# Patient Record
Sex: FEMALE | Race: WHITE | NOT HISPANIC OR LATINO | Employment: OTHER | URBAN - METROPOLITAN AREA
[De-identification: names, ages, dates, MRNs, and addresses within clinical notes are randomized per-mention and may not be internally consistent; named-entity substitution may affect disease eponyms.]

---

## 2023-12-01 ENCOUNTER — HOSPITAL ENCOUNTER (INPATIENT)
Facility: HOSPITAL | Age: 83
LOS: 8 days | Discharge: NURSING, REHAB OR LONG-TERM CARE | DRG: 880 | End: 2023-12-12
Attending: STUDENT IN AN ORGANIZED HEALTH CARE EDUCATION/TRAINING PROGRAM | Admitting: STUDENT IN AN ORGANIZED HEALTH CARE EDUCATION/TRAINING PROGRAM
Payer: MEDICARE

## 2023-12-01 DIAGNOSIS — N17.9 AKI (ACUTE KIDNEY INJURY): ICD-10-CM

## 2023-12-01 DIAGNOSIS — N30.00 ACUTE CYSTITIS WITHOUT HEMATURIA: ICD-10-CM

## 2023-12-01 DIAGNOSIS — R01.1 MURMUR, CARDIAC: ICD-10-CM

## 2023-12-01 DIAGNOSIS — S32.9XXA CLOSED DISPLACED FRACTURE OF PELVIS, UNSPECIFIED PART OF PELVIS, INITIAL ENCOUNTER: ICD-10-CM

## 2023-12-01 DIAGNOSIS — R41.82 ALTERED MENTAL STATUS: ICD-10-CM

## 2023-12-01 DIAGNOSIS — S32.509A: Primary | ICD-10-CM

## 2023-12-01 DIAGNOSIS — E44.0 MODERATE MALNUTRITION: ICD-10-CM
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
## 2023-12-01 LAB
ALBUMIN SERPL-MCNC: 2.8 G/DL (ref 3.4–4.8)
ALBUMIN/GLOB SERPL: 0.8 RATIO (ref 1.1–2)
ALP SERPL-CCNC: 114 UNIT/L (ref 40–150)
ALT SERPL-CCNC: 22 UNIT/L (ref 0–55)
APPEARANCE UR: CLEAR
AST SERPL-CCNC: 35 UNIT/L (ref 5–34)
BACTERIA #/AREA URNS AUTO: ABNORMAL /HPF
BASOPHILS # BLD AUTO: 0.1 X10(3)/MCL
BASOPHILS NFR BLD AUTO: 1 %
BILIRUB SERPL-MCNC: 1.1 MG/DL
BILIRUB UR QL STRIP.AUTO: NEGATIVE
BUN SERPL-MCNC: 24.8 MG/DL (ref 9.8–20.1)
CALCIUM SERPL-MCNC: 9.1 MG/DL (ref 8.4–10.2)
CHLORIDE SERPL-SCNC: 106 MMOL/L (ref 98–107)
CO2 SERPL-SCNC: 22 MMOL/L (ref 23–31)
COLOR UR AUTO: YELLOW
CREAT SERPL-MCNC: 1.45 MG/DL (ref 0.55–1.02)
EOSINOPHIL # BLD AUTO: 0.12 X10(3)/MCL (ref 0–0.9)
EOSINOPHIL NFR BLD AUTO: 1.1 %
ERYTHROCYTE [DISTWIDTH] IN BLOOD BY AUTOMATED COUNT: 22.6 % (ref 11.5–17)
GFR SERPLBLD CREATININE-BSD FMLA CKD-EPI: 36 MLS/MIN/1.73/M2
GLOBULIN SER-MCNC: 3.7 GM/DL (ref 2.4–3.5)
GLUCOSE SERPL-MCNC: 135 MG/DL (ref 82–115)
GLUCOSE UR QL STRIP.AUTO: NORMAL
HCT VFR BLD AUTO: 29.1 % (ref 37–47)
HGB BLD-MCNC: 9.7 G/DL (ref 12–16)
HYALINE CASTS #/AREA URNS LPF: >20 /LPF
IMM GRANULOCYTES # BLD AUTO: 0.16 X10(3)/MCL (ref 0–0.04)
IMM GRANULOCYTES NFR BLD AUTO: 1.5 %
KETONES UR QL STRIP.AUTO: NEGATIVE
LACTATE SERPL-SCNC: 1.2 MMOL/L (ref 0.5–2.2)
LEUKOCYTE ESTERASE UR QL STRIP.AUTO: 25
LYMPHOCYTES # BLD AUTO: 1.89 X10(3)/MCL (ref 0.6–4.6)
LYMPHOCYTES NFR BLD AUTO: 18.1 %
MAGNESIUM SERPL-MCNC: 2.1 MG/DL (ref 1.6–2.6)
MCH RBC QN AUTO: 33 PG (ref 27–31)
MCHC RBC AUTO-ENTMCNC: 33.3 G/DL (ref 33–36)
MCV RBC AUTO: 99 FL (ref 80–94)
MONOCYTES # BLD AUTO: 0.91 X10(3)/MCL (ref 0.1–1.3)
MONOCYTES NFR BLD AUTO: 8.7 %
MUCOUS THREADS URNS QL MICRO: ABNORMAL /LPF
NEUTROPHILS # BLD AUTO: 7.26 X10(3)/MCL (ref 2.1–9.2)
NEUTROPHILS NFR BLD AUTO: 69.6 %
NITRITE UR QL STRIP.AUTO: NEGATIVE
NRBC BLD AUTO-RTO: 0.3 %
PH UR STRIP.AUTO: 5.5 [PH]
PLATELET # BLD AUTO: 165 X10(3)/MCL (ref 130–400)
PLATELETS.RETICULATED NFR BLD AUTO: 13.2 % (ref 0.9–11.2)
PMV BLD AUTO: ABNORMAL FL
POTASSIUM SERPL-SCNC: 4.9 MMOL/L (ref 3.5–5.1)
PROT SERPL-MCNC: 6.5 GM/DL (ref 5.8–7.6)
PROT UR QL STRIP.AUTO: NEGATIVE
RBC # BLD AUTO: 2.94 X10(6)/MCL (ref 4.2–5.4)
RBC #/AREA URNS AUTO: ABNORMAL /HPF
RBC UR QL AUTO: ABNORMAL
SODIUM SERPL-SCNC: 136 MMOL/L (ref 136–145)
SP GR UR STRIP.AUTO: 1.01 (ref 1–1.03)
SQUAMOUS #/AREA URNS LPF: ABNORMAL /HPF
UROBILINOGEN UR STRIP-ACNC: 2
WBC # SPEC AUTO: 10.44 X10(3)/MCL (ref 4.5–11.5)
WBC #/AREA URNS AUTO: ABNORMAL /HPF

## 2023-12-01 PROCEDURE — 85025 COMPLETE CBC W/AUTO DIFF WBC: CPT | Performed by: STUDENT IN AN ORGANIZED HEALTH CARE EDUCATION/TRAINING PROGRAM

## 2023-12-01 PROCEDURE — 80053 COMPREHEN METABOLIC PANEL: CPT | Performed by: STUDENT IN AN ORGANIZED HEALTH CARE EDUCATION/TRAINING PROGRAM

## 2023-12-01 PROCEDURE — 81001 URINALYSIS AUTO W/SCOPE: CPT | Performed by: STUDENT IN AN ORGANIZED HEALTH CARE EDUCATION/TRAINING PROGRAM

## 2023-12-01 PROCEDURE — 83735 ASSAY OF MAGNESIUM: CPT | Performed by: STUDENT IN AN ORGANIZED HEALTH CARE EDUCATION/TRAINING PROGRAM

## 2023-12-01 PROCEDURE — 27000221 HC OXYGEN, UP TO 24 HOURS

## 2023-12-01 PROCEDURE — 99285 EMERGENCY DEPT VISIT HI MDM: CPT | Mod: 25

## 2023-12-01 PROCEDURE — 87040 BLOOD CULTURE FOR BACTERIA: CPT | Performed by: STUDENT IN AN ORGANIZED HEALTH CARE EDUCATION/TRAINING PROGRAM

## 2023-12-01 PROCEDURE — 63600175 PHARM REV CODE 636 W HCPCS: Performed by: STUDENT IN AN ORGANIZED HEALTH CARE EDUCATION/TRAINING PROGRAM

## 2023-12-01 PROCEDURE — 83605 ASSAY OF LACTIC ACID: CPT | Performed by: STUDENT IN AN ORGANIZED HEALTH CARE EDUCATION/TRAINING PROGRAM

## 2023-12-01 PROCEDURE — 96360 HYDRATION IV INFUSION INIT: CPT

## 2023-12-01 RX ADMIN — SODIUM CHLORIDE, POTASSIUM CHLORIDE, SODIUM LACTATE AND CALCIUM CHLORIDE 1000 ML: 600; 310; 30; 20 INJECTION, SOLUTION INTRAVENOUS at 10:12

## 2023-12-02 PROBLEM — S32.9XXA CLOSED DISPLACED FRACTURE OF PELVIS: Status: ACTIVE | Noted: 2023-12-02

## 2023-12-02 LAB
ANISOCYTOSIS BLD QL SMEAR: ABNORMAL
DIGOXIN SERPL-MCNC: 0.3 NG/ML (ref 0.8–2)
MACROCYTES BLD QL SMEAR: ABNORMAL
PLATELET # BLD EST: NORMAL 10*3/UL
POIKILOCYTOSIS BLD QL SMEAR: ABNORMAL
POLYCHROMASIA BLD QL SMEAR: SLIGHT
RBC MORPH BLD: ABNORMAL
STOMATOCYTES (OLG): SLIGHT
TARGETS BLD QL SMEAR: SLIGHT
TEAR DROP CELL (OLG): SLIGHT

## 2023-12-02 PROCEDURE — 99204 PR OFFICE/OUTPT VISIT, NEW, LEVL IV, 45-59 MIN: ICD-10-PCS | Mod: 57,,, | Performed by: STUDENT IN AN ORGANIZED HEALTH CARE EDUCATION/TRAINING PROGRAM

## 2023-12-02 PROCEDURE — 96372 THER/PROPH/DIAG INJ SC/IM: CPT | Performed by: STUDENT IN AN ORGANIZED HEALTH CARE EDUCATION/TRAINING PROGRAM

## 2023-12-02 PROCEDURE — G0378 HOSPITAL OBSERVATION PER HR: HCPCS

## 2023-12-02 PROCEDURE — 99204 OFFICE O/P NEW MOD 45 MIN: CPT | Mod: 57,,, | Performed by: STUDENT IN AN ORGANIZED HEALTH CARE EDUCATION/TRAINING PROGRAM

## 2023-12-02 PROCEDURE — 25000003 PHARM REV CODE 250: Performed by: NURSE PRACTITIONER

## 2023-12-02 PROCEDURE — 63600175 PHARM REV CODE 636 W HCPCS: Performed by: STUDENT IN AN ORGANIZED HEALTH CARE EDUCATION/TRAINING PROGRAM

## 2023-12-02 PROCEDURE — 27197 CLSD TX PELVIC RING FX: CPT | Mod: LT,,, | Performed by: STUDENT IN AN ORGANIZED HEALTH CARE EDUCATION/TRAINING PROGRAM

## 2023-12-02 PROCEDURE — 80162 ASSAY OF DIGOXIN TOTAL: CPT | Performed by: NURSE PRACTITIONER

## 2023-12-02 PROCEDURE — 27197 PR CLOSED RX PELVIC RING FX/SUBLUX W/O MANIPULATION: ICD-10-PCS | Mod: LT,,, | Performed by: STUDENT IN AN ORGANIZED HEALTH CARE EDUCATION/TRAINING PROGRAM

## 2023-12-02 PROCEDURE — 25500020 PHARM REV CODE 255: Performed by: STUDENT IN AN ORGANIZED HEALTH CARE EDUCATION/TRAINING PROGRAM

## 2023-12-02 PROCEDURE — 25000003 PHARM REV CODE 250: Performed by: STUDENT IN AN ORGANIZED HEALTH CARE EDUCATION/TRAINING PROGRAM

## 2023-12-02 RX ORDER — ONDANSETRON 4 MG/1
4 TABLET, ORALLY DISINTEGRATING ORAL EVERY 6 HOURS PRN
Status: DISCONTINUED | OUTPATIENT
Start: 2023-12-02 | End: 2023-12-12 | Stop reason: HOSPADM

## 2023-12-02 RX ORDER — ENOXAPARIN SODIUM 100 MG/ML
40 INJECTION SUBCUTANEOUS EVERY 12 HOURS
Status: DISCONTINUED | OUTPATIENT
Start: 2023-12-02 | End: 2023-12-08

## 2023-12-02 RX ORDER — SODIUM CHLORIDE, SODIUM LACTATE, POTASSIUM CHLORIDE, CALCIUM CHLORIDE 600; 310; 30; 20 MG/100ML; MG/100ML; MG/100ML; MG/100ML
INJECTION, SOLUTION INTRAVENOUS CONTINUOUS
Status: DISCONTINUED | OUTPATIENT
Start: 2023-12-02 | End: 2023-12-06

## 2023-12-02 RX ORDER — BUDESONIDE 0.5 MG/2ML
0.5 INHALANT ORAL
Status: DISCONTINUED | OUTPATIENT
Start: 2023-12-02 | End: 2023-12-12 | Stop reason: HOSPADM

## 2023-12-02 RX ORDER — OXYCODONE HYDROCHLORIDE 5 MG/1
5 TABLET ORAL EVERY 4 HOURS PRN
Status: DISCONTINUED | OUTPATIENT
Start: 2023-12-02 | End: 2023-12-12 | Stop reason: HOSPADM

## 2023-12-02 RX ORDER — LIDOCAINE HYDROCHLORIDE 20 MG/ML
1 INJECTION, SOLUTION EPIDURAL; INFILTRATION; INTRACAUDAL; PERINEURAL ONCE AS NEEDED
Status: DISCONTINUED | OUTPATIENT
Start: 2023-12-02 | End: 2023-12-12 | Stop reason: HOSPADM

## 2023-12-02 RX ORDER — TALC
6 POWDER (GRAM) TOPICAL NIGHTLY PRN
Status: DISCONTINUED | OUTPATIENT
Start: 2023-12-02 | End: 2023-12-12 | Stop reason: HOSPADM

## 2023-12-02 RX ORDER — ACETAMINOPHEN 500 MG
650 TABLET ORAL DAILY
COMMUNITY

## 2023-12-02 RX ORDER — TORSEMIDE 20 MG/1
20 TABLET ORAL DAILY
Status: ON HOLD | COMMUNITY
End: 2023-12-12 | Stop reason: HOSPADM

## 2023-12-02 RX ORDER — LEVALBUTEROL INHALATION SOLUTION 1.25 MG/3ML
1 SOLUTION RESPIRATORY (INHALATION) EVERY 6 HOURS PRN
Status: DISCONTINUED | OUTPATIENT
Start: 2023-12-02 | End: 2023-12-12 | Stop reason: HOSPADM

## 2023-12-02 RX ORDER — LEVALBUTEROL INHALATION SOLUTION 1.25 MG/3ML
1 SOLUTION RESPIRATORY (INHALATION) EVERY 6 HOURS PRN
COMMUNITY

## 2023-12-02 RX ORDER — ACETAMINOPHEN 325 MG/1
650 TABLET ORAL DAILY
Status: DISCONTINUED | OUTPATIENT
Start: 2023-12-02 | End: 2023-12-03

## 2023-12-02 RX ORDER — DIGOXIN 125 MCG
125 TABLET ORAL DAILY
Status: DISCONTINUED | OUTPATIENT
Start: 2023-12-02 | End: 2023-12-12 | Stop reason: HOSPADM

## 2023-12-02 RX ORDER — ACETAMINOPHEN 325 MG/1
650 TABLET ORAL EVERY 4 HOURS PRN
Status: DISCONTINUED | OUTPATIENT
Start: 2023-12-02 | End: 2023-12-12 | Stop reason: HOSPADM

## 2023-12-02 RX ORDER — BUDESONIDE 1 MG/2ML
0.5 INHALANT ORAL
COMMUNITY

## 2023-12-02 RX ORDER — METOPROLOL SUCCINATE 25 MG/1
25 TABLET, EXTENDED RELEASE ORAL DAILY
COMMUNITY

## 2023-12-02 RX ORDER — FLUTICASONE FUROATE AND VILANTEROL 100; 25 UG/1; UG/1
1 POWDER RESPIRATORY (INHALATION) DAILY PRN
Status: DISCONTINUED | OUTPATIENT
Start: 2023-12-02 | End: 2023-12-12 | Stop reason: HOSPADM

## 2023-12-02 RX ORDER — PANTOPRAZOLE SODIUM 40 MG/1
40 TABLET, DELAYED RELEASE ORAL DAILY PRN
COMMUNITY

## 2023-12-02 RX ORDER — SIMVASTATIN 20 MG/1
20 TABLET, FILM COATED ORAL DAILY
COMMUNITY

## 2023-12-02 RX ORDER — PANTOPRAZOLE SODIUM 40 MG/1
40 TABLET, DELAYED RELEASE ORAL DAILY
Status: DISCONTINUED | OUTPATIENT
Start: 2023-12-02 | End: 2023-12-12 | Stop reason: HOSPADM

## 2023-12-02 RX ORDER — POTASSIUM CHLORIDE 1125 MG/1
20 TABLET, EXTENDED RELEASE ORAL 2 TIMES DAILY
Status: ON HOLD | COMMUNITY
End: 2023-12-12 | Stop reason: HOSPADM

## 2023-12-02 RX ORDER — DIGOXIN 125 MCG
125 TABLET ORAL DAILY
COMMUNITY

## 2023-12-02 RX ORDER — OXYCODONE HYDROCHLORIDE 10 MG/1
10 TABLET ORAL EVERY 4 HOURS PRN
Status: DISCONTINUED | OUTPATIENT
Start: 2023-12-02 | End: 2023-12-12 | Stop reason: HOSPADM

## 2023-12-02 RX ADMIN — ACETAMINOPHEN 650 MG: 325 TABLET, FILM COATED ORAL at 01:12

## 2023-12-02 RX ADMIN — ENOXAPARIN SODIUM 40 MG: 40 INJECTION SUBCUTANEOUS at 08:12

## 2023-12-02 RX ADMIN — ENOXAPARIN SODIUM 40 MG: 40 INJECTION SUBCUTANEOUS at 09:12

## 2023-12-02 RX ADMIN — ACETAMINOPHEN 650 MG: 325 TABLET, FILM COATED ORAL at 04:12

## 2023-12-02 RX ADMIN — DIGOXIN 125 MCG: 125 TABLET ORAL at 08:12

## 2023-12-02 RX ADMIN — Medication 6 MG: at 09:12

## 2023-12-02 RX ADMIN — OXYCODONE HYDROCHLORIDE 5 MG: 5 TABLET ORAL at 08:12

## 2023-12-02 RX ADMIN — METOPROLOL SUCCINATE 12.5 MG: 25 TABLET, EXTENDED RELEASE ORAL at 09:12

## 2023-12-02 RX ADMIN — SODIUM CHLORIDE, POTASSIUM CHLORIDE, SODIUM LACTATE AND CALCIUM CHLORIDE: 600; 310; 30; 20 INJECTION, SOLUTION INTRAVENOUS at 04:12

## 2023-12-02 RX ADMIN — CEFTRIAXONE SODIUM 2 G: 2 INJECTION, POWDER, FOR SOLUTION INTRAMUSCULAR; INTRAVENOUS at 04:12

## 2023-12-02 RX ADMIN — PANTOPRAZOLE SODIUM 40 MG: 40 TABLET, DELAYED RELEASE ORAL at 08:12

## 2023-12-02 RX ADMIN — IOPAMIDOL 100 ML: 755 INJECTION, SOLUTION INTRAVENOUS at 12:12

## 2023-12-02 NOTE — ED PROVIDER NOTES
Encounter Date: 12/1/2023    SCRIBE #1 NOTE: I, Tobi Munoz, am scribing for, and in the presence of,  Sukhdev Vega IV, MD. I have scribed the following portions of the note - Other sections scribed: HPI, ROS, PE.       History     Chief Complaint   Patient presents with    Altered Mental Status     Fall 2 days ago went to [facility] - dx w/ pelvic fx and uti, hallucinations present before fall 2 days ago - cont and worsening - family concerned for increased ams and unable to have home health come see pt      83 year old female with pmhx of dementia presents to ED for AMS onset 2 days ago. Daughter states that pt fell and hit head 2 days ago and went to [facility] anD was diagnosed with pelvic fracture and UTI and sent home with norco, has not been seen by home health. Daughter states that pt has become progressively more confused and disoriented over the past 2 days, with mood changes.  Pt denies pain.    The history is provided by a relative. The history is limited by the condition of the patient. No  was used.     Review of patient's allergies indicates:  No Known Allergies  No past medical history on file.  No past surgical history on file.  No family history on file.     Review of Systems   Unable to perform ROS: Dementia   Psychiatric/Behavioral:  Positive for confusion.        Physical Exam     Initial Vitals [12/01/23 2153]   BP Pulse Resp Temp SpO2   (!) 102/58 (!) 119 20 99.7 °F (37.6 °C) 96 %      MAP       --         Physical Exam    Nursing note and vitals reviewed.  Constitutional: She is not diaphoretic. No distress.   Cardiovascular:  Regular rhythm.   Tachycardia present.         No murmur heard.  Pulmonary/Chest: Breath sounds normal. No respiratory distress. She has no wheezes. She has no rales.   Abdominal: Abdomen is soft. She exhibits no distension. There is no abdominal tenderness.   Musculoskeletal:      Comments: L hip tenderness     Neurological: She is alert.   Altered,  confused, disoriented    Psychiatric: She has a normal mood and affect.         ED Course   Procedures  Labs Reviewed   COMPREHENSIVE METABOLIC PANEL - Abnormal; Notable for the following components:       Result Value    Carbon Dioxide 22 (*)     Glucose Level 135 (*)     Blood Urea Nitrogen 24.8 (*)     Creatinine 1.45 (*)     Albumin Level 2.8 (*)     Globulin 3.7 (*)     Albumin/Globulin Ratio 0.8 (*)     Aspartate Aminotransferase 35 (*)     All other components within normal limits   URINALYSIS, REFLEX TO URINE CULTURE - Abnormal; Notable for the following components:    Blood, UA Trace (*)     Urobilinogen, UA 2.0 (*)     Leukocyte Esterase, UA 25 (*)     WBC, UA 6-10 (*)     Mucous, UA Trace (*)     Hyaline Casts, UA >20 (*)     RBC, UA 11-20 (*)     All other components within normal limits   CBC WITH DIFFERENTIAL - Abnormal; Notable for the following components:    RBC 2.94 (*)     Hgb 9.7 (*)     Hct 29.1 (*)     MCV 99.0 (*)     MCH 33.0 (*)     RDW 22.6 (*)     IG# 0.16 (*)     IPF 13.2 (*)     All other components within normal limits   BLOOD SMEAR MICROSCOPIC EXAM ([facility]) - Abnormal; Notable for the following components:    RBC Morph Abnormal (*)     Anisocytosis 1+ (*)     Macrocytosis 1+ (*)     Poikilocytosis 1+ (*)     Polychromasia Slight (*)     Stomatocytes Slight (*)     Target Cells Slight (*)     Tear Drops Slight (*)     All other components within normal limits   LACTIC ACID, PLASMA - Normal   MAGNESIUM - Normal   BLOOD CULTURE [facility]   BLOOD CULTURE [facility]   CBC W/ AUTO DIFFERENTIAL    Narrative:     The following orders were created for panel order CBC auto differential.  Procedure                               Abnormality         Status                     ---------                               -----------         ------                     CBC with Differential[[id number]]       Abnormal            Final result                 Please view results for these tests on the individual orders.           Imaging Results              CT Chest Abdomen Pelvis With IV Contrast (XPD) NO Oral Contrast (Preliminary result)  Result time 12/02/23 00:52:12      Preliminary result by Piero Nicolas MD (12/02/23 00:52:12)                   Narrative:    START OF REPORT:  Technique: CT Scan of the chest abdomen and pelvis was performed with intravenous contrast with axial as well as sagittal and, coronal images.    Dosage Information: Automated Exposure Control was utilized.    Comparison: None.    Clinical History: Altered Mental Status (Fall 2 days ago went to [facility] [place] [place] - dx w/ pelvic fx and uti, hallucinations present before fall 2 days ago - cont and worsening - family concerned for increased ams.    Findings:  Soft Tissues: Unremarkable.  Neck: The thyroid gland appear unremarkable.  Mediastinum: The mediastinal structures are within normal limits.  Heart: The heart size is within normal limits. Severe coronary artery calcification is seen.  Aorta: No aortic dissection or aneurysm is seen. Moderate aortic calcification is seen in the thoracic aorta.  Pulmonary Arteries: Unremarkable.  Lungs: Moderate emphysematous changes are identified in the lungs. No focal consolidation is seen. A geographic area of ground-glass opacity is seen in the posterior aspect of the right upper lobe, which may reflect focal infiltrate or possibly lung contusion (image 37 series 3).  Pleura: No effusions or pneumothorax are identified.  Bony Structures:  Spine: Mild spondylolytic changes are seen in the thoracic spine.  Ribs: The ribs appear unremarkable.  Abdomen:  Abdominal Wall: There is a small umbilical hernia which contains mesenteric fat.  Liver: The liver appears unremarkable.  Biliary System: No intrahepatic or extrahepatic biliary duct dilatation is seen.  Gallbladder: Surgical clips are seen in the gallbladder fossa consistent with prior cholecystectomy.  Pancreas: The pancreas appears unremarkable.  Spleen: The spleen appears  unremarkable.  Adrenals: The adrenal glands appear unremarkable.  Kidneys: The kidneys appear unremarkable with no stones cysts masses or hydronephrosis.  Aorta: There is moderate calcification of the abdominal aorta and its branches.  IVC: Unremarkable.  Bowel:  Esophagus: The visualized esophagus appears unremarkable.  Stomach: The stomach appears unremarkable.  Duodenum: Unremarkable appearing duodenum.  Small Bowel: The small bowel appears unremarkable.  Colon: Multiple diverticula are seen in the colon. No associated inflammatory stranding or pericolonic fluid is seen to suggest diverticulitis.  Appendix: The appendix is not identified but no inflammatory changes are seen in the right lower quadrant to suggest appendicitis.  Peritoneum: No intraperitoneal free air or ascites is seen.    Pelvis:  Bladder: The bladder is nondistended and cannot be definitively evaluated. A clements catheter is in place.  Female:  Ovaries: No adnexal masses are seen.    Bony structures: The bones are osteopenic.  Dorsal Spine: There is moderate spondylosis of the visualized dorsal spine. Chronic compression deformity is seen involving L1 vertebral body with vertebra plana.  Bony Pelvis: There are comminuted displaced fractures involving the left superior and inferior pubic rami (series 2, images 167-183). There is a soft tissue hematoma in the extraperitoneal spaces of the left pelvis/ in the left perivesical region, which measures approximately 3 x 1.7 cm (series 2, image 165). There is asymmetric intramuscular hematoma involving the left obturator internus muscle (series 2, image 169). There is extensive perivesical fat stranding. There is small amount of free fluid in the presacral spaces.      Impression:  1. Severe coronary artery calcification is seen.  2. There are comminuted displaced fractures involving the left superior and inferior pubic rami (series 2, images 167-183). There is a soft tissue hematoma in the  extraperitoneal spaces of the left pelvis/ in the left perivesical region, which measures approximately 3 x 1.7 cm (series 2, image 165). There is asymmetric intramuscular hematoma involving the left obturator internus muscle (series 2, image 169). There is extensive perivesical fat stranding. There is small amount of free fluid in the presacral spaces.  3. Moderate emphysematous changes are identified in the lungs. No focal consolidation is seen. A geographic area of ground-glass opacity is seen in the posterior aspect of the right upper lobe, which may reflect focal infiltrate or possibly lung contusion (image 37 series 3). Follow-up as clinically indicated.  4. Details and other findings as discussed above.                                         CT Cervical Spine Without Contrast (Preliminary result)  Result time 12/02/23 00:48:21      Preliminary result by Piero Nicolas MD (12/02/23 00:48:21)                   Narrative:    START OF REPORT:  Technique: CT of the cervical spine was performed without intravenous contrast with axial as well as sagittal and coronal images.    Comparison: None.    Dosage Information: Automated Exposure Control was utilized 329.28 mGy.cm.    Clinical history: Altered Mental Status (Fall 2 days ago went to [facility] [place] - dx w/ pelvic fx and uti, hallucinations present before fall 2 days ago - cont and worsening - family concerned for increased ams.    Findings:  Lung apices: Chest CT findings discussed separately.  Spine:  Spinal canal: The spinal canal appears unremarkable.  Spinal cord: The spinal cord appears unremarkable.  Mineralization: Within normal limits for age.  Scoliosis: No significant scoliosis is seen.  Vertebral Fusion: No vertebral fusion is identified.  Listhesis: No significant listhesis is identified.  Lordosis: The cervical lordosis is maintained.  Intervertebral disc spaces: Multilevel loss of disc height is seen.  Osteophytes: Mild multilevel endplate osteophytes are  seen.  Endplate Sclerosis: Mild multilevel endplate sclerosis is seen.  Uncovertebral degenerative changes: Mild multilevel uncovertebral joint arthrosis is seen.  Facet degenerative changes: Mild multilevel facet degenerative changes are seen.  Fractures: No acute cervical spine fracture dislocation or subluxation is seen.    Miscellaneous:  Soft Tissues: Unremarkable.      Impression:  1. No acute cervical spine fracture dislocation or subluxation is seen.  2. Degenerative changes and other details as above.                                         CT Head Without Contrast (Preliminary result)  Result time 12/02/23 00:47:15      Preliminary result by Piero Nicolas MD (12/02/23 00:47:15)                   Narrative:    START OF REPORT:  Technique: CT of the head was performed without intravenous contrast with axial as well as coronal and sagittal images.    Comparison: None.    Dosage Information: Automated Exposure Control was utilized 991.17 mGy.cm.    Clinical history: Altered Mental Status (Fall 2 days ago went to [facility] [place] - dx w/ pelvic fx and uti, hallucinations present before fall 2 days ago - cont and worsening - family concerned for increased ams.    Findings:  Hemorrhage: No acute intracranial hemorrhage is seen.  CSF spaces: The ventricles, sulci and basal cisterns all appear moderately prominent consistent with global cerebral atrophy.  Brain parenchyma: There is preservation of the grey white junction throughout. Moderate microvascular change is seen in portions of the periventricular and deep white matter tracts.  Cerebellum: Unremarkable.  Vascular: Moderate atheromatous calcification of the intracranial arteries is seen.  Sella and skull base: The sella appears to be within normal limits for age.  Herniation: None.  Intracranial calcifications: Incidental note is made of bilateral choroid plexus calcification. Incidental note is made of some pineal region calcification.  Calvarium: No acute linear or  depressed skull fracture is seen.    Maxillofacial Structures:  Paranasal sinuses: The visualized paranasal sinuses appear clear with no mucoperiosteal thickening or air fluid levels identified.  Orbits: The orbits appear unremarkable.  Zygomatic arches: The zygomatic arches are intact and unremarkable.  Temporal bones and mastoids: The temporal bones and mastoids appear unremarkable.  TMJ: The mandibular condyles appear normally placed with respect to the mandibular fossa.      Impression:  1. No acute intracranial traumatic injury identified. Details and other findings as noted above.                                         X-Ray Chest AP Portable (In process)                      Medications   LIDOcaine (PF) 20 mg/mL (2%) injection 20 mg (has no administration in time range)   ondansetron disintegrating tablet 4 mg (has no administration in time range)   melatonin tablet 6 mg (has no administration in time range)   lactated ringers infusion (has no administration in time range)   enoxaparin injection 40 mg (has no administration in time range)   acetaminophen tablet 650 mg (has no administration in time range)   oxyCODONE immediate release tablet 5 mg (has no administration in time range)   oxyCODONE immediate release tablet Tab 10 mg (has no administration in time range)   cefTRIAXone (ROCEPHIN) 2 g in dextrose 5 % in water (D5W) 100 mL IVPB (MB+) (has no administration in time range)   lactated ringers bolus 1,000 mL (0 mLs Intravenous Stopped 12/1/23 2315)   iopamidoL (ISOVUE-370) injection 100 mL (100 mLs Intravenous Given 12/2/23 0006)     Medical Decision Making  82 yo presenting after fall 2 days ago - diagnosed pelvic fractures at outside hospital, told to f/u with home health. Brought back by family for worsening confusion, inability to ambulate, has not been able to set up home health. Exam as above, CT with displaced L pelvic fractures. Admit to trauma with ortho consultation     Problems  Addressed:  Altered mental status: acute illness or injury that poses a threat to life or bodily functions  Closed displaced fracture of pelvis, unspecified part of pelvis, initial encounter: acute illness or injury that poses a threat to life or bodily functions  Fall, initial encounter: acute illness or injury that poses a threat to life or bodily functions    Amount and/or Complexity of Data Reviewed  Independent Historian:      Details: Granddaughter bedside   Labs: ordered.  Radiology: ordered.  Discussion of management or test interpretation with external provider(s): Ortho Dr. Cabral - will consult on patient  Trauma - will admit     Risk  Prescription drug management.  Decision regarding hospitalization.            Scribe Attestation:   Scribe #1: I performed the above scribed service and the documentation accurately describes the services I performed. I attest to the accuracy of the note.    Attending Attestation:           Physician Attestation for Scribe:  Physician Attestation Statement for Scribe #1: I, Sukhdev Vega IV, MD, reviewed documentation, as scribed by Tobi Munoz in my presence, and it is both accurate and complete.             ED Course as of 12/02/23 0351   Sat Dec 02, 2023   0110 Trauma consulted will evaluate [AC]      ED Course User Index  [AC] Sukhdev Vega IV, MD                           Clinical Impression:  Final diagnoses:  [R41.82] Altered mental status (Primary)  [W19.XXXA] Fall, initial encounter  [S32.9XXA] Closed displaced fracture of pelvis, unspecified part of pelvis, initial encounter          ED Disposition Condition    Observation                 Sukhdev Vega IV, MD  12/02/23 0351

## 2023-12-02 NOTE — CONSULTS
Ortho consult note    Chief Complaint:  Pelvis fractures    Consulting Physician: Self, Aaareferral    History of present illness:    Patient is a 83-year-old female with a history of dementia atrial flutter on Eliquis who presents to the hospital for a pelvis fracture from a fall that she sustained 2 days ago.  Patient lives at home with her .  The granddaughter is at bedside and the history is taken from the granddaughter.  The granddaughter states that over the last 6 months the patient has been declining mentally.  She states that the patient fell out of a chair 2 days ago.  She has pain in bilateral lower extremities diffusely.    No past medical history on file.    No past surgical history on file.    Current Facility-Administered Medications   Medication    acetaminophen tablet 650 mg    acetaminophen tablet 650 mg    budesonide nebulizer solution 0.5 mg    cefTRIAXone (ROCEPHIN) 2 g in dextrose 5 % in water (D5W) 100 mL IVPB (MB+)    digoxin tablet 125 mcg    enoxaparin injection 40 mg    fluticasone furoate-vilanteroL 100-25 mcg/dose diskus inhaler 1 puff    And    umeclidinium 62.5 mcg/actuation inhalation capsule 62.5 mcg    lactated ringers infusion    levalbuterol nebulizer solution 1.25 mg    LIDOcaine (PF) 20 mg/mL (2%) injection 20 mg    melatonin tablet 6 mg    metoprolol succinate (TOPROL-XL) 24 hr split tablet 12.5 mg    ondansetron disintegrating tablet 4 mg    oxyCODONE immediate release tablet 5 mg    oxyCODONE immediate release tablet Tab 10 mg    pantoprazole EC tablet 40 mg       Review of patient's allergies indicates:  No Known Allergies    No family history on file.    Social History     Socioeconomic History    Marital status:        Review of Systems:    Constitution:   Denies chills, fever, and sweats.  HENT:   Denies headaches or blurry vision.  Cardiovascular:  Denies chest pain or irregular heart beat.  Respiratory:   Denies cough or shortness of  breath.  Gastrointestinal:  Denies abdominal pain, nausea, or vomiting.  Musculoskeletal:   Denies muscle cramps.  Neurological:   Denies dizziness or focal weakness.  Psychiatric/Behavior: Normal mental status.  Hematology/Lymph:  Denies bleeding problem or easy bruising/bleeding.  Skin:    Denies rash or suspicious lesions.    Examination:    Vital Signs:    Vitals:    12/02/23 0354 12/02/23 0725 12/02/23 0844 12/02/23 1100   BP: 104/69 107/61  (!) 100/56   Pulse: 86 105  101   Resp: (!) 21 18 18 18   Temp:  98.2 °F (36.8 °C)  98 °F (36.7 °C)   TempSrc:  Oral  Oral   SpO2: 95%   95%       There is no height or weight on file to calculate BMI.    Constitution:   Well-developed, well nourished patient in no acute distress.  Neurological:   Stuporous     Psychiatric/Behavior: Does not respond to commands  Respiratory:   No shortness of breath.  Eyes:    Extraoccular muscles intact  Skin:    No scars, rash or suspicious lesions.    MSK:   Bilateral lower extremities:  No open wounds or rashes.  Diffuse tenderness throughout the entire lower extremity bilaterally.  Pelvis is stable to shucking.  She has mild pain with logroll of both hips.  Her feet are warm well perfused    Imaging:   CT scan of the chest abdomen and pelvis shows left-sided superior and inferior rami fractures consistent with LC 1 pelvis injury     Assessment:  LC 1 pelvis ring injury    Plan:  This can be treated non operatively.  She can be weight-bearing as tolerated.  We recommend physical therapy for mobilization.  She does not need surgery given her stable fracture pattern as well as her age and activity level.  I can see her in clinic 4 weeks after discharge    She can continue DVT prophylaxis and pain control per the primary team

## 2023-12-02 NOTE — NURSING
Nurses Note -- 4 Eyes      12/2/2023   4:43 AM      Skin assessed during: Admit      [x] No Altered Skin Integrity Present    []Prevention Measures Documented      [] Yes- Altered Skin Integrity Present or Discovered   [] LDA Added if Not in Epic (Describe Wound)   [] New Altered Skin Integrity was Present on Admit and Documented in LDA   [] Wound Image Taken    Wound Care Consulted? No    Attending Nurse:  Marilyn Dow RN/Staff Member:   Gianluca Mancera.

## 2023-12-02 NOTE — H&P
Trauma Surgery   History and Physical Note    Patient Name: Pepper Gardner  YOB: 1940  Date: 12/02/2023 2:41 AM  Date of Admission: 12/1/2023  HD#0  POD#* No surgery found *    PRESENTING HISTORY   Chief Complaint/Reason for Admission:  Pelvic fracture    History of Present Illness:  83-year-old female with history of dementia, and a flutter on Eliquis, who presents to the ED for evaluation of altered mental status and pelvic fractures that occurred 2 days ago.  Patient's family member at bedside provides history as patient has altered mental status.  Per report, patient fell out of a chair forward and hit her head and hip 2 days ago.  Went to an outside hospital and was diagnosed with pelvic fracture and UTI and was discharged home same day with narcotics and recommendations to contact PCP to set up home health.  Over the next 2 days, the patient had deteriorated clinically with worsening altered mental status, and family was unable to obtain home health care.  Upon arrival to the ED patient was afebrile, hemodynamically stable, however did have altered mental status.  New workup demonstrates evidence of persistent urinary tract infection as well as comminuted and displaced fractures involving the left superior and inferior pubic rami as well as soft tissue hematoma in the extraperitoneal space of the left pelvis and left perivesicular region.  Also notable for perivesicular fat stranding.  Trauma surgery consulted for evaluation admission in light of recent fall and pelvic fractures.    Review of Systems:  12 point ROS negative except as stated in HPI    PAST HISTORY:   Past medical history:  No past medical history on file.    Past surgical history:  No past surgical history on file.    Family history:  No family history on file.    Social history:  Social History     Socioeconomic History    Marital status:      Social History     Tobacco Use   Smoking Status Not on file   Smokeless  "Tobacco Not on file      Social History     Substance and Sexual Activity   Alcohol Use Not on file        MEDICATIONS & ALLERGIES:   Allergies: Review of patient's allergies indicates:  No Known Allergies  Home Meds: No current outpatient medications   No current facility-administered medications on file prior to encounter.     No current outpatient medications on file prior to encounter.      No current facility-administered medications on file prior to encounter.     No current outpatient medications on file prior to encounter.     Scheduled Meds:  Continuous Infusions:  PRN Meds:    OBJECTIVE:   Vital Signs:  VITAL SIGNS: 24 HR MIN & MAX LAST   Temp  Min: 99.7 °F (37.6 °C)  Max: 99.7 °F (37.6 °C)  99.7 °F (37.6 °C)   BP  Min: 102/58  Max: 114/76  114/76    Pulse  Min: 107  Max: 119  107    Resp  Min: 20  Max: 20  20    SpO2  Min: 94 %  Max: 96 %  (!) 94 %      HT:    WT:    BMI:     Intake/output: No intake/output data recorded.     Lines/drains/airway:       Peripheral IV - Single Lumen 12/01/23 20 G Anterior;Left Hand (Active)   Number of days: 1            Urethral Catheter 12/02/23 0031 (Active)   Number of days: 0       Physical Exam:  General:  Mildly agitated, well-nourished  HEENT:  Normocephalic, atraumatic  CV:  RR, 2+ DPs bilaterally  Resp/chest:  On nasal cannula, breathing comfortably  GI:  Abdomen soft, non-tender, non-distended  :  Lincoln catheter in place draining yellow urine  MSK:  Appropriate range of motion in both bilateral upper and lower extremities, without evidence of pain with palpation and pelvis  Neuro: GCS 13. CNII-XII grossly intact, alert and oriented to person, place, and time. Strength and motor function grossly intact to all extremities, sensation intact to all extremities.  Skin/Wounds:  No evidence of any skin laceration or bruising.    Labs:  Troponin:  No results for input(s): "TROPONINI" in the last 72 hours.  CBC:  Recent Labs     12/01/23  2217   WBC 10.44   RBC 2.94* " "  HGB 9.7*   HCT 29.1*      MCV 99.0*   MCH 33.0*   MCHC 33.3     CMP:  Recent Labs     12/01/23  2217   CALCIUM 9.1   ALBUMIN 2.8*      K 4.9   CO2 22*   BUN 24.8*   CREATININE 1.45*   ALKPHOS 114   ALT 22   AST 35*   BILITOT 1.1     Lactic Acid:  No results for input(s): "LACTATE" in the last 72 hours.  ETOH:  No results for input(s): "ETHANOL" in the last 72 hours.   Urine Drug Screen:  No results for input(s): "COCAINE", "OPIATE", "BARBITURATE", "AMPHETAMINE", "FENTANYL", "CANNABINOIDS", "MDMA" in the last 72 hours.    Invalid input(s): "BENZODIAZEPINE", "PHENCYCLIDINE"   ABG  No results for input(s): "PH", "PO2", "PCO2", "HCO3", "BE" in the last 168 hours.      Diagnostic Results:  CT abdomen pelvis:   1. Severe coronary artery calcification is seen.  2. There are comminuted displaced fractures involving the left superior and inferior pubic rami (series 2, images 167-183). There is a soft tissue hematoma in the extraperitoneal spaces of the left pelvis/ in the left perivesical region, which measures approximately 3 x 1.7 cm (series 2, image 165). There is asymmetric intramuscular hematoma involving the left obturator internus muscle (series 2, image 169). There is extensive perivesical fat stranding. There is small amount of free fluid in the presacral spaces.  3. Moderate emphysematous changes are identified in the lungs. No focal consolidation is seen. A geographic area of ground-glass opacity is seen in the posterior aspect of the right upper lobe, which may reflect focal infiltrate or possibly lung contusion (image 37 series 3). Follow-up as clinically indicated.  4. Details and other findings as discussed above.    CT C spine:   Negative for acute injury     CT Head: negative for acute injury   ASSESSMENT & PLAN:    83-year-old female with a history of a flutter on Eliquis and dementia who presents for evaluation of altered mental status and left pubic rami fractures    - admit to trauma " surgery service  - orthopedic surgery consulted for pubic rami fractures, pending official recommendations  - NPO until cleared by Orthopedic surgery, IV fluids  - IV Rocephin for UTI  - nonweightbearing of the bilateral lower extremities until cleared by Orthopedic surgery  - can continue Lincoln catheter in setting of altered mental status and UTI as well as need for accurate I's and O's for pelvic fracture  - Lovenox PPX  - PT/OT when appropriate  - multimodal pain control  - encourage out of bed, ambulation, IS when appropriate       Wendy Horowitz MD   [employer] General Surgery PGY 4

## 2023-12-03 PROBLEM — N39.0 UTI (URINARY TRACT INFECTION): Status: ACTIVE | Noted: 2023-12-03

## 2023-12-03 PROBLEM — N17.9 AKI (ACUTE KIDNEY INJURY): Status: ACTIVE | Noted: 2023-12-03

## 2023-12-03 LAB
ANION GAP SERPL CALC-SCNC: 8 MEQ/L
BASOPHILS # BLD AUTO: 0.08 X10(3)/MCL
BASOPHILS NFR BLD AUTO: 0.8 %
BUN SERPL-MCNC: 19.7 MG/DL (ref 9.8–20.1)
CALCIUM SERPL-MCNC: 8.6 MG/DL (ref 8.4–10.2)
CHLORIDE SERPL-SCNC: 104 MMOL/L (ref 98–107)
CO2 SERPL-SCNC: 22 MMOL/L (ref 23–31)
CREAT SERPL-MCNC: 1.05 MG/DL (ref 0.55–1.02)
CREAT/UREA NIT SERPL: 19
EOSINOPHIL # BLD AUTO: 0.05 X10(3)/MCL (ref 0–0.9)
EOSINOPHIL NFR BLD AUTO: 0.5 %
ERYTHROCYTE [DISTWIDTH] IN BLOOD BY AUTOMATED COUNT: 22.4 % (ref 11.5–17)
GFR SERPLBLD CREATININE-BSD FMLA CKD-EPI: 53 MLS/MIN/1.73/M2
GLUCOSE SERPL-MCNC: 107 MG/DL (ref 82–115)
HCT VFR BLD AUTO: 27.4 % (ref 37–47)
HGB BLD-MCNC: 8.8 G/DL (ref 12–16)
IMM GRANULOCYTES # BLD AUTO: 0.15 X10(3)/MCL (ref 0–0.04)
IMM GRANULOCYTES NFR BLD AUTO: 1.5 %
LYMPHOCYTES # BLD AUTO: 1.32 X10(3)/MCL (ref 0.6–4.6)
LYMPHOCYTES NFR BLD AUTO: 13.4 %
MCH RBC QN AUTO: 31.8 PG (ref 27–31)
MCHC RBC AUTO-ENTMCNC: 32.1 G/DL (ref 33–36)
MCV RBC AUTO: 98.9 FL (ref 80–94)
MONOCYTES # BLD AUTO: 0.79 X10(3)/MCL (ref 0.1–1.3)
MONOCYTES NFR BLD AUTO: 8 %
NEUTROPHILS # BLD AUTO: 7.45 X10(3)/MCL (ref 2.1–9.2)
NEUTROPHILS NFR BLD AUTO: 75.8 %
NRBC BLD AUTO-RTO: 1 %
PLATELET # BLD AUTO: 195 X10(3)/MCL (ref 130–400)
PMV BLD AUTO: 12.8 FL (ref 7.4–10.4)
POTASSIUM SERPL-SCNC: 4.4 MMOL/L (ref 3.5–5.1)
RBC # BLD AUTO: 2.77 X10(6)/MCL (ref 4.2–5.4)
SODIUM SERPL-SCNC: 134 MMOL/L (ref 136–145)
WBC # SPEC AUTO: 9.84 X10(3)/MCL (ref 4.5–11.5)

## 2023-12-03 PROCEDURE — 27000221 HC OXYGEN, UP TO 24 HOURS

## 2023-12-03 PROCEDURE — G0378 HOSPITAL OBSERVATION PER HR: HCPCS

## 2023-12-03 PROCEDURE — 97167 OT EVAL HIGH COMPLEX 60 MIN: CPT

## 2023-12-03 PROCEDURE — 25000003 PHARM REV CODE 250: Performed by: NURSE PRACTITIONER

## 2023-12-03 PROCEDURE — 80048 BASIC METABOLIC PNL TOTAL CA: CPT | Performed by: STUDENT IN AN ORGANIZED HEALTH CARE EDUCATION/TRAINING PROGRAM

## 2023-12-03 PROCEDURE — 85025 COMPLETE CBC W/AUTO DIFF WBC: CPT | Performed by: STUDENT IN AN ORGANIZED HEALTH CARE EDUCATION/TRAINING PROGRAM

## 2023-12-03 PROCEDURE — 97163 PT EVAL HIGH COMPLEX 45 MIN: CPT

## 2023-12-03 PROCEDURE — 63600175 PHARM REV CODE 636 W HCPCS: Performed by: STUDENT IN AN ORGANIZED HEALTH CARE EDUCATION/TRAINING PROGRAM

## 2023-12-03 PROCEDURE — 25000242 PHARM REV CODE 250 ALT 637 W/ HCPCS: Performed by: NURSE PRACTITIONER

## 2023-12-03 PROCEDURE — 96372 THER/PROPH/DIAG INJ SC/IM: CPT | Performed by: STUDENT IN AN ORGANIZED HEALTH CARE EDUCATION/TRAINING PROGRAM

## 2023-12-03 PROCEDURE — 25000003 PHARM REV CODE 250: Performed by: STUDENT IN AN ORGANIZED HEALTH CARE EDUCATION/TRAINING PROGRAM

## 2023-12-03 RX ORDER — ACETAMINOPHEN 325 MG/1
650 TABLET ORAL EVERY 6 HOURS
Status: DISCONTINUED | OUTPATIENT
Start: 2023-12-03 | End: 2023-12-12 | Stop reason: HOSPADM

## 2023-12-03 RX ORDER — LIDOCAINE 50 MG/G
1 PATCH TOPICAL
Status: DISCONTINUED | OUTPATIENT
Start: 2023-12-03 | End: 2023-12-12 | Stop reason: HOSPADM

## 2023-12-03 RX ORDER — METHOCARBAMOL 500 MG/1
500 TABLET, FILM COATED ORAL DAILY
Status: DISCONTINUED | OUTPATIENT
Start: 2023-12-03 | End: 2023-12-12 | Stop reason: HOSPADM

## 2023-12-03 RX ORDER — DIPHENHYDRAMINE HYDROCHLORIDE 50 MG/ML
12.5 INJECTION INTRAMUSCULAR; INTRAVENOUS NIGHTLY PRN
Status: DISCONTINUED | OUTPATIENT
Start: 2023-12-03 | End: 2023-12-12 | Stop reason: HOSPADM

## 2023-12-03 RX ADMIN — ACETAMINOPHEN 650 MG: 325 TABLET, FILM COATED ORAL at 10:12

## 2023-12-03 RX ADMIN — METOPROLOL SUCCINATE 12.5 MG: 25 TABLET, EXTENDED RELEASE ORAL at 09:12

## 2023-12-03 RX ADMIN — UMECLIDINIUM 62.5 MCG: 62.5 AEROSOL, POWDER ORAL at 01:12

## 2023-12-03 RX ADMIN — PANTOPRAZOLE SODIUM 40 MG: 40 TABLET, DELAYED RELEASE ORAL at 10:12

## 2023-12-03 RX ADMIN — DIGOXIN 125 MCG: 125 TABLET ORAL at 10:12

## 2023-12-03 RX ADMIN — SODIUM CHLORIDE, POTASSIUM CHLORIDE, SODIUM LACTATE AND CALCIUM CHLORIDE: 600; 310; 30; 20 INJECTION, SOLUTION INTRAVENOUS at 01:12

## 2023-12-03 RX ADMIN — ENOXAPARIN SODIUM 40 MG: 40 INJECTION SUBCUTANEOUS at 10:12

## 2023-12-03 RX ADMIN — ENOXAPARIN SODIUM 40 MG: 40 INJECTION SUBCUTANEOUS at 09:12

## 2023-12-03 RX ADMIN — METHOCARBAMOL 500 MG: 500 TABLET ORAL at 10:12

## 2023-12-03 RX ADMIN — CEFTRIAXONE SODIUM 2 G: 2 INJECTION, POWDER, FOR SOLUTION INTRAMUSCULAR; INTRAVENOUS at 03:12

## 2023-12-03 RX ADMIN — LIDOCAINE 5% 1 PATCH: 700 PATCH TOPICAL at 06:12

## 2023-12-03 RX ADMIN — ACETAMINOPHEN 650 MG: 325 TABLET, FILM COATED ORAL at 06:12

## 2023-12-03 RX ADMIN — ACETAMINOPHEN 650 MG: 325 TABLET, FILM COATED ORAL at 01:12

## 2023-12-03 NOTE — H&P
[facility] Hospital Medicine History & Physical Examination       Patient Name: Pepper Gardner  MRN: [id number]  Patient Class: OP- Observation   Admission Date: 12/1/2023   Admitting Physician: Jose Enrique Avilez MD  Length of Stay: 0  Attending Physician: Karl Mc MD   Primary Care Provider: Dianna, Primary Doctor  Face-to-Face encounter date: 12/03/2023  Code Status:Full code   Chief Complaint: Altered Mental Status (Fall 2 days ago went to [facility] [place] - dx w/ pelvic fx and uti, hallucinations present before fall 2 days ago - cont and worsening - family concerned for increased ams and unable to have home health come see pt )        Patient information was obtained from patient, patient's family, past medical records and ER records.     HISTORY OF PRESENT ILLNESS:   Pepper Gardner is a 83 y.o. female with a past medical history of essential hypertension, hyperlipidemia, CAD, atrial flutter, valvular heart disease, and dementia presented to [facility] on 12/1/2023 for altered mental status.  Family reported altered mental status began 2 days ago with a fall from her chair.  [place] [facility] [place] diagnosed with a pelvic fracture and UTI.  Patient was discharged home with antibiotics and Norco.  Daughter reported worsening confusion and disorientation, prompting them to [facility] ED. initial vital signs upon arrival to ED were /58, pulse 119, respirations 20, temperature 36.7° C, and SpO2 96% on room air.  Labs revealed WBC 10.44, RBC 2.94, hemoglobin 9.7, hematocrit 29.1, MCV 99, CO2 22, BUN 24.8, creatinine 1.45, glucose 135, and lactic acid 1.2.  UA revealed trace occult blood, 25 leukocytes, 11-20 red blood cells, and 6-10 white blood cells.  Blood cultures were obtained.  CT head without contrast revealed no acute intracranial traumatic abnormality.  CT cervical spine revealed no acute cervical spine fracture, dislocation, or subluxation seen with degenerative changes.  CT chest, abdomen, and  pelvis with IV contrast revealed comminuted fractures of the left pubic rami with associated pelvic sidewall and prevesical extraperitoneal hematoma, severe likely chronic age indeterminate compression deformity of L1, severe calcific burden of the coronary arteries with widespread mild-to-moderate aortoiliac and branch vessel atherosclerotic changes, and chronic bilateral lung parenchymal changes with focal ground-glass density at the posterior right upper lobe that is nonspecific.  Chest x-ray revealed mild congestive process.  Patient was given LR and IV Rocephin 2 g in ED.  Orthopedics was consulted.  Patient was admitted to trauma services.  Orthopedics recommended nonoperative treatment with weight-bearing as tolerated.  PT and OT were consulted.  Hospital Medicine was consulted for transition of care and further medical management on 12/30/2023.    PAST MEDICAL HISTORY:   Essential hypertension  Hyperlipidemia  Atrial flutter  Valvular heart disease   Dementia    PAST SURGICAL HISTORY:   Cholecystectomy  Cardiac stents    ALLERGIES:   Patient has no known allergies.    FAMILY HISTORY:   Reviewed and negative    SOCIAL HISTORY:   Former tobacco use  Denied illicit drug and alcohol use     HOME MEDICATIONS:     Prior to Admission medications    Medication Sig Start Date End Date Taking? Authorizing Provider   acetaminophen (TYLENOL) 500 MG tablet Take 650 mg by mouth Daily.    Provider, Historical   apixaban (ELIQUIS) 5 mg Tab Take 5 mg by mouth 2 (two) times daily.    Provider, Historical   budesonide 1 mg/2 mL NbSp Inhale 0.5 mg into the lungs as needed (As needed once daily). Controller    Provider, Historical   digoxin (LANOXIN) 125 mcg tablet Take 125 mcg by mouth once daily.    Provider, Historical   fluticasone-umeclidin-vilanter (TRELEGY ELLIPTA) 100-62.5-25 mcg DsDv Inhale 1 puff into the lungs daily as needed (As needed).    Provider, Historical   levalbuterol (XOPENEX) 1.25 mg/3 mL nebulizer solution  Take 1 ampule by nebulization every 6 (six) hours as needed for Wheezing. Rescue    Provider, Historical   metoprolol succinate (TOPROL-XL) 25 MG 24 hr tablet Take 25 mg by mouth 2 (two) times a day.    Provider, Historical   pantoprazole (PROTONIX) 40 MG tablet Take 40 mg by mouth daily as needed (Daily prn).    Provider, Historical   potassium chloride SA (KLOR-CON M15) 15 MEQ tablet Take 20 mEq by mouth 2 (two) times daily.    Provider, Historical   simvastatin (ZOCOR) 20 MG tablet Take 20 mg by mouth Daily.    Provider, Historical   torsemide (DEMADEX) 20 MG Tab Take 20 mg by mouth once daily.    Provider, Historical       REVIEW OF SYSTEMS:   Except as documented, all other systems reviewed and negative     PHYSICAL EXAM:     VITAL SIGNS: 24 HRS MIN & MAX LAST   Temp  Min: 96.7 °F (35.9 °C)  Max: 98.4 °F (36.9 °C) 97.5 °F (36.4 °C)   BP  Min: 99/56  Max: 122/74 (!) 101/58   Pulse  Min: 89  Max: 127  89   Resp  Min: 16  Max: 19 18   SpO2  Min: 91 %  Max: 96 % 96 %       General appearance: Elderly female in no apparent distress.  HEENNT: Atraumatic head.   Lungs: Clear to auscultation bilaterally.   Heart: Regular rate and rhythm.    Abdomen: Soft, non-distended, non-tender. Bowel sounds are normal.   Extremities: No cyanosis, clubbing, or edema. No deformities.   Skin: No Rash. Warm and dry.   Neuro: Awake, alert, and oriented to self. Generalized weakness   Psych/mental status: Appropriate mood and affect.     LABS AND IMAGING:     Recent Labs   Lab 12/01/23 2217 12/03/23 0411   WBC 10.44 9.84   RBC 2.94* 2.77*   HGB 9.7* 8.8*   HCT 29.1* 27.4*   MCV 99.0* 98.9*   MCH 33.0* 31.8*   MCHC 33.3 32.1*   RDW 22.6* 22.4*    195   MPV  --  12.8*       Recent Labs   Lab 12/01/23 2217 12/03/23 0411    134*   K 4.9 4.4   CO2 22* 22*   BUN 24.8* 19.7   CREATININE 1.45* 1.05*   CALCIUM 9.1 8.6   MG 2.10  --    ALBUMIN 2.8*  --    ALKPHOS 114  --    ALT 22  --    AST 35*  --    BILITOT 1.1  --         Microbiology Results (last 7 days)       Procedure Component Value Units Date/Time    Blood culture #1 **CANNOT BE ORDERED STAT** [[id number]]  (Normal) Collected: 12/01/23 2217    Order Status: Completed Specimen: Blood Updated: 12/02/23 2300     CULTURE, BLOOD ([facility]) No Growth At 24 Hours    Blood culture #2 **CANNOT BE ORDERED STAT** [[id number]]  (Normal) Collected: 12/01/23 2217    Order Status: Completed Specimen: Blood Updated: 12/02/23 2300     CULTURE, BLOOD ([facility]) No Growth At 24 Hours             CT Chest Abdomen Pelvis With IV Contrast (XPD) NO Oral Contrast  EXAMINATION  CT CHEST ABDOMEN PELVIS WITH IV CONTRAST (XPD)    CLINICAL HISTORY  Level 2 trauma;  fall 2 days ago, initially presented to outside facility    TECHNIQUE  Post-contrast helical-acquisition CT images were obtained and multiplanar reformats accomplished by a CT technologist at a separate workstation and pushed to PACS for physician review.    CONTRAST  *IV: ISOVUE-370, 100 mL  *Enteric: none    COMPARISON  No similar modality comparisons are available at the time of exam interpretation.    FINDINGS  Images were reviewed in soft tissue, lung, and bone windows.    Exam quality: Severely degraded secondary to constant patient movement and resulting artifact throughout the scan.    Lines/tubes: Lincoln catheter is well positioned within the urinary bladder.    Chest: The thyroid gland appear unremarkable. The heart size is within normal limits. Severe coronary artery calcification is seen.  No aortic dissection or aneurysm is seen. Moderate aortic calcification is seen in the thoracic aorta.  Pulmonary arteries are without evidence of large central filling defect. Moderate emphysematous changes are identified in the lungs. No focal consolidation is seen. A geographic area of ground-glass opacity is seen in the posterior aspect of the right upper lobe, which may reflect focal infiltrate or possibly lung contusion (series 3, image 37). No  effusions or pneumothorax are identified.  The esophagus is unremarkable.    Abdomen: The liver, pancreas, and spleen are unremarkable. No intrahepatic or extrahepatic biliary duct dilatation is appreciated.  There are changes of cholecystectomy.  No acute or suspicious focal abnormality of the adrenal glands or kidneys. There is moderate calcification of the abdominal aorta and its branches.  The IVC and portal venous structures are normal in appearance.  No findings of high-grade gastrointestinal obstruction or acute inflammatory process. Multiple diverticula are seen in the colon, without associated inflammatory stranding or pericolonic fluid is seen to suggest diverticulitis. No intraperitoneal free air or ascites is seen.    Pelvis: The bladder is nondistended and cannot be definitively evaluated. No adnexal masses are seen.  The uterus is unremarkable for CT assessment.    Musculoskeletal: There is a small umbilical hernia which contains mesenteric fat.  The bones are diffusely osteopenic. There are spondylitic changes throughout the visualized spinal column.  Severe compression deformity is seen involving L1 vertebral body with vertebra plana, overall favored to represent chronic injury but of relatively limited assessment secondary to patient/technical factors discussed above. There are comminuted displaced fractures involving the left superior and inferior pubic rami (series 2, images 167-183). There is a soft tissue hematoma in the extraperitoneal spaces along the left pelvic sidewall and left perivesical region, which measures approximately 3 x 1.7 cm (series 2, image 165). There is asymmetric intramuscular hematoma involving the left obturator internus muscle (series 2, image 169). There is extensive perivesical fat stranding. There is small amount of free fluid in the presacral spaces.    IMPRESSION  1. Comminuted fractures of the left pubic rami with associated pelvic sidewall and prevesical  extraperitoneal hematoma.  2. Severe, likely chronic but otherwise age-indeterminate compression deformity of L1.  Widespread bone demineralization also present.  3. Severe calcific burden of the coronary arteries; widespread mild to moderate aortoiliac and branch vessel atherosclerotic changes.  4. Chronic bilateral lung parenchymal changes with focal ground-glass density at the posterior right upper lobe that is nonspecific; differential includes contusion given reported trauma or sequela of atypical infectious or inflammatory process.  5. No evidence of traumatic solid organ or hollow viscus injury through the abdomen and pelvis.  6. Chronic secondary details discussed above.  ==========    No significant discrepancy identified in relation to the teleradiology preliminary report.    RADIATION DOSE  Automated tube current modulation, weight-based exposure dosing, and/or iterative reconstruction technique utilized to reach lowest reasonably achievable exposure rate.    DLP: 984 mGy*cm    Electronically signed by: Js Schneider  Date:    12/02/2023  Time:    14:24  X-Ray Chest AP Portable  Narrative: EXAMINATION:  XR CHEST AP PORTABLE    CLINICAL HISTORY:  Altered mental status, unspecified    TECHNIQUE:  One view    COMPARISON:  July 15, 2013.    FINDINGS:  Cardiopericardial silhouette appearance is similar.  There are chronic interstitial changes of lungs with superimposed mild congestive process.  There is no focally dense consolidation, significant fluid within the pleural space or pneumothorax.  Impression: Mild congestive process..    Electronically signed by: Jaxson Galindo  Date:    12/02/2023  Time:    12:58  CT Head Without Contrast  Narrative: Technique:CT of the head was performed without intravenous contrast with axial as well as coronal and sagittal images.    Comparison:None.    Dosage Information:Automated Exposure Control was utilized 991.17 mGy.cm.    Clinical history:Altered Mental Status (Fall 2  days ago went to [facility] [place] - dx w/ pelvic fx and uti, hallucinations present before fall 2 days ago - cont and worsening - family concerned for increased ams.    Findings:    Hemorrhage:No acute intracranial hemorrhage is seen.    CSF spaces:The ventricles, sulci and basal cisterns all appear moderately prominent consistent with global cerebral atrophy.    Brain parenchyma:There is no acute large vessel territory infarct.  Marked microvascular change is seen in portions of the periventricular and deep white matter tracts.    Cerebellum:Unremarkable.    Vascular:Moderate atheromatous calcification of the intracranial arteries is seen.    Calvarium:No acute linear or depressed skull fracture is seen.    Maxillofacial Structures:    Paranasal sinuses:The visualized paranasal sinuses appear clear with no mucoperiosteal thickening or air fluid levels identified.  Impression: Impression:    No acute intracranial traumatic injury identified. Details and other findings as noted above.    No significant discrepancy with overnight report.    Electronically signed by: Jaxson Galindo  Date:    12/02/2023  Time:    09:47  CT Cervical Spine Without Contrast  Narrative: Technique:CT of the cervical spine was performed without intravenous contrast with axial as well as sagittal and coronal images.    Comparison:None.    Dosage Information:Automated exposure control was utilized.    Clinical history:None.    Findings:    Lung apices:The visualized lung apices appear unremarkable.    Spine:    Vertebral Fusion:There is degenerative fusion of C2 with C3 both at the posterior disc level as well as the bilateral facets.    Listhesis:There is grade I degenerative anterolisthesis C4 on C5.    Lordosis:The cervical lordosis is maintained.    Intervertebral disc spaces:Multilevel loss of disc height is seen.    Osteophytes:Pronounced multilevel endplate osteophytes are seen.    Endplate Sclerosis:Moderate multilevel endplate sclerosis is  seen.    Uncovertebral degenerative changes:Moderate multilevel uncovertebral joint arthrosis is seen.    Facet degenerative changes:Moderate multilevel facet degenerative changes are seen.    Fractures:No acute cervical spine fracture dislocation or subluxation is seen.    This exam does not exclude the possibility of intrathecal soft tissue, ligamentous or vascular injury  Impression: Impression:    1. No acute cervical spine fracture dislocation or subluxation is seen.    2. Degenerative changes and other details as above.    No significant discrepancy with overnight report    Electronically signed by: Jaxson Galindo  Date:    12/02/2023  Time:    09:45        ASSESSMENT & PLAN:   Assessment:  Left superior and inferior pubic ramus fractures s/p fall   UTI   SABINO, improving  Acute metabolic encephalopathy versus worsening dementia  Normocytic anemia  History of essential hypertension, hyperlipidemia, atrial flutter, valvular heart disease, and dementia      Plan:  IVF   Pain control   Ortho recommended weight-bearing as tolerated and will follow-up in clinic 4 weeks after discharge  PT/OT   IV Rocephin 1 g daily  Close H&H monitoring  Continue appropriate home medications once med rec updated   Labs in a.m.    VTE Prophylaxis: Already on Lovenox     __________________________________________________________________________  INPATIENT LIST OF MEDICATIONS     Scheduled Meds:   acetaminophen  650 mg Oral Q6H    cefTRIAXone (ROCEPHIN) IVPB  2 g Intravenous Q12H    digoxin  125 mcg Oral Daily    enoxparin  40 mg Subcutaneous Q12H (prophylaxis, 0900/2100)    LIDOcaine  1 patch Transdermal Q24H    methocarbamoL  500 mg Oral Daily    metoprolol succinate  12.5 mg Oral BID    pantoprazole  40 mg Oral Daily    umeclidinium  1 capsule Inhalation Daily     Continuous Infusions:   lactated ringers 100 mL/hr at 12/03/23 0100     PRN Meds:.acetaminophen, budesonide, diphenhydrAMINE, fluticasone furoate-vilanteroL **AND**  umeclidinium, levalbuterol, LIDOcaine (PF) 20 mg/mL (2%), melatonin, ondansetron, oxyCODONE, oxyCODONE      I, Santiago Jones PA-C, have reviewed and discussed the case with Dr. Karl Dodge MD   Please see the following addendum for further assessment and plan from there attending MD.    12/03/2023    Dr dodge - chart reviewed and pt examined. Family in room present  to help w HPI. Pt  got gradually altered at home leading to a fall w/o loc. Pt was [place] at [facility] [place] where she was given  pain meds/abx's for suspected uti / diagnosed with superior and inferior pubic ramus fx and sent home . Family decided  to bring her to [facility] where she was admitted by trauma team w findings of above plus vannesa. Pt gas been seen by ortho and recs given . Pt has been seen by PT and recs for high intensity rehab already given .  Ct head/ cervical spine negative. Ct abd and pelvis with left pubic ranus fractures and hematoma. By labs we could have an  uti on rocephin.     Pt is alert and active but  not oriented  Head- atraumatic  Neck - supple  Lungs - cta   Heart s1s2 with HSLEM w rad to left sternal border  Abdo soft and depressible w bs+  Extremities- symmetrical w no edema  Neuro - still confused but as per son at bedside ... A whole lot better .    A/p  Essential hypertension  Hyperlipidemia  Atrial flutter, hx pf   Holosystolic ejection murmur w rad to left axillary line..mitral regurgitation?  Valvular heart disease   Acute metabolic encephalopathy superimposed over chronic chronic Dementia?  Comminuted fractures of the left pubic rami with associated pelvic sidewall and prevesical extraperitoneal hematoma.  Severe, likely chronic but otherwise age-indeterminate compression deformity of L1  vannesa  History of essential hypertension, hyperlipidemia, atrial flutter, valvular heart disease, and dementia        - continue PT  - consult for rehab placement  -Await final urine cx  -Continue rocephin  -We can always do a speech  eval for cognitive fx once improved and at baseline   -echo  -speech eval  Am labs     Agree w ASSESSMENT AND PLANS DONE BY PA MS MCKEON. SOME CORRECTIONS WERE DONE TO HPI/ PHYSICAL EXAM/ ASSESSMENT AND PLANS AT THE TIME OF MY EVALUATION.       Discharge Planning and Disposition: No mobility needs. Ambulating well. Good social support system.   Anticipated discharge    All diagnosis and differential diagnosis have been reviewed; assessment and plan has been documented; I have personally reviewed the labs and test results that are presently available; I have reviewed the patients medication list; I have reviewed the consulting providers response and recommendations. I have reviewed or attempted to review medical records based upon their availability.    All of the patient and family questions have been addressed and answered. Patient's is agreeable to the above stated plan. I will continue to monitor closely and make adjustments to medical management as needed.    ADRIANO KAPOOR MD  [facility]  12/03/2023

## 2023-12-03 NOTE — PLAN OF CARE
Problem: Occupational Therapy  Goal: Occupational Therapy Goal  Description: Goals to be met by: 1/3/23     Patient will increase functional independence with ADLs by performing:    UE Dressing with [place].  LE Dressing with Modified [place].  Grooming while seated at sink with [place].  Toileting from bedside commode with Minimal Assistance for hygiene and clothing management.   Stand pivot transfers with Minimal Assistance.  Squat pivot transfers with Supervision.  Toilet transfer to bedside commode with Supervision squat pivot.    Outcome: Ongoing, Progressing

## 2023-12-03 NOTE — PT/OT/SLP EVAL
Occupational Therapy  Evaluation    Name: Pepper Gardner  MRN: [id number]  Admitting Diagnosis:   Recent Surgery: * No surgery found *      Recommendations:     Discharge therapy intensity: High Intensity Therapy   Discharge Equipment Recommendations:  bedside commode, hip kit  Barriers to discharge:   (severity of injury)    Assessment:     Pepper Gardner is a 83 y.o. female with a medical diagnosis of UTI & GLF resulting in pelvic ring fx non-operative.  She presents with the following performance deficits affecting function: weakness, gait instability, impaired endurance, impaired balance, decreased lower extremity function, decreased safety awareness, impaired self care skills, impaired cognition, pain, impaired functional mobility. Pt's occupational performance and functional mobil significantly limited by pain. Pt currently requires max-total A for fucntional t/f's at this time as well as total A for LBD and toileting ADLs. Pt can benefit from IP rehab prior to d/c home to improve functional IND.    Rehab Prognosis: Good; patient would benefit from acute skilled OT services to address these deficits and reach maximum level of function.       Plan:     Patient to be seen 5 x/week to address the above listed problems via self-care/home management, therapeutic activities, therapeutic exercises  Plan of Care Expires: 01/03/24  Plan of Care Reviewed with:      Subjective     Chief Complaint: Pain with movement  Patient/Family Comments/goals: Get better    Occupational Profile:  Living Environment: Lives with spouse in [facility] with no DENIS. Pt has a walk-in shower with grab bars and shower chair as well as rasied commode with grab bars in bathroom.  Previous level of function: mod I-IND with ADLs, did not perform IADLs, assist with medication mgt 2/2 dementia. On 3L O2 at home - son reports pt on O2 majority of the day and wears a night. Using SPC for ambulation. Family encouraging use of RW instead however  unsuccessful.  Roles and Routines: Pt's son states pt was not very active prior to admit and lived a someone sedentary lifestyle recently since cognitive decline.  Equipment Used at Home: walker, rolling, cane, quad, raised toilet, shower chair, grab bar (transport chair)  Assistance upon Discharge: ?  is available. Kids work during the day.    Pain/Comfort:  Pain Rating 1: 9/10  Location - Side 1: Bilateral  Location 1: hip  Pain Addressed 1: Cessation of Activity, Reposition (pt just received tylenol for pain)    Patients cultural, spiritual, Oriental orthodox conflicts given the current situation: no    Objective:     OT communicated with RN prior to session.      Patient was found supine with peripheral IV, oxygen upon OT entry to room. Son present    General Precautions: Standard,    Orthopedic Precautions: RUE weight bearing as tolerated, LUE weight bearing as tolerated  Braces:      Vital Signs: on 3L O2. Vitals WNL    Bed Mobility:    Patient completed Scooting/Bridging with total assistance  Patient completed Supine to Sit with maximal assistance  Patient completed Sit to Supine with maximal assistance    Functional Mobility/Transfers:  Patient completed Sit <> Stand Transfer with max A x2  with  rolling walker   Functional Mobility: Attempted side step to the R, however pt with poor foot clearance and weightshifting/Wbing to take step.     Activities of Daily Living:  Upper Body Dressing: supervision    Lower Body Dressing: total assistance    Toileting: total assistance          Functional Cognition:  Dementia dx    Visual Perceptual Skills:  Intact    Upper Extremity Function:  Right Upper Extremity:   WFL    Left Upper Extremity:  WFL    Balance:   Static standing balance:max -max A x2 with RW    Therapeutic Positioning  Risk for acquired pressure injuries is increased due to relative decrease in mobility d/t hospitalization .    OT interventions performed during the course of today's session:    Education was provided on benefits of and recommendations for therapeutic positioning    Skin assessment: full body skin assessment was performed  and all bony prominences were assessed    Findings: no redness or breakdown noted    OT recommendations for therapeutic positioning throughout hospitalization:   Follow [facility] Pressure Injury Prevention Protocol      Patient Education:  Patient and son/s were provided with verbal education education regarding OT role/goals/POC.  Understanding was verbalized.     Patient left supine with all lines intact, call button in reach, and son present    GOALS:   Multidisciplinary Problems       Occupational Therapy Goals          Problem: Occupational Therapy    Goal Priority Disciplines Outcome Interventions   Occupational Therapy Goal     OT, PT/OT Ongoing, Progressing    Description: Goals to be met by: 1/3/23     Patient will increase functional independence with ADLs by performing:    UE Dressing with [place].  LE Dressing with Modified [place].  Grooming while seated at sink with [place].  Toileting from bedside commode with Minimal Assistance for hygiene and clothing management.   Stand pivot transfers with Minimal Assistance.  Squat pivot transfers with Supervision.  Toilet transfer to bedside commode with Supervision squat pivot.                         History:     No past medical history on file.    No past surgical history on file.    Time Tracking:     OT Date of Treatment:    OT Start Time: 1108  OT Stop Time: 1135  OT Total Time (min): 27 min    Billable Minutes:Evaluation high complex    12/3/2023

## 2023-12-03 NOTE — PLAN OF CARE
Problem: Physical Therapy  Goal: Physical Therapy Goal  Description: Goals to be met by: 1/3/24     Patient will increase functional independence with mobility by performing:    Sit to supine with Modified [place]  Sit to stand transfer with Modified [place]  Tolerate gait assessment.    Outcome: Ongoing, Progressing

## 2023-12-03 NOTE — PT/OT/SLP EVAL
Physical Therapy Evaluation    Patient Name:  Pepper Gardner   MRN:  [id number]    Recommendations:     Discharge therapy intensity: High Intensity Therapy   Discharge Equipment Recommendations: to be determined by next level of care   Barriers to discharge: Impaired mobility and Ongoing medical needs    Assessment:     Pepper Gardner is a 83 y.o. female admitted with a medical diagnosis of Closed displaced fracture of pelvis.  She presents with the following impairments/functional limitations: weakness, impaired endurance, impaired functional mobility, gait instability .    Rehab Prognosis: Good; patient would benefit from acute skilled PT services to address these deficits and reach maximum level of function.    Recent Surgery: * No surgery found *      Plan:     During this hospitalization, patient to be seen 6 x/week to address the identified rehab impairments via gait training, therapeutic activities, therapeutic exercises, neuromuscular re-education and progress toward the following goals:    Plan of Care Expires:  01/03/24    Subjective     Chief Complaint: Pain in both hips with moving.  Patient/Family Comments/goals: To get better and go home  Pain/Comfort:  Pain Rating 1: 9/10  Location - Side 1: Bilateral  Location 1: hip  Pain Addressed 1: Reposition, Distraction, Pre-medicate for activity    Patients cultural, spiritual, Episcopal conflicts given the current situation: no    Living Environment:  Lives with  in [facility] without stairs.  Prior to admission, patients level of function was independent with ADLs, requires a SPC or RW for gait.  Equipment used at home: walker, rolling, cane, straight.  Upon discharge, patient will have assistance from her .    Objective:     Communicated with nurse prior to session.  Patient found HOB elevated with peripheral IV, telemetry  upon PT entry to room.    General Precautions: Standard,    Orthopedic Precautions:LLE weight bearing as tolerated, RLE weight  bearing as tolerated   Braces:    Respiratory Status: Nasal cannula, flow 3 L/min  HR: 81      Exams:  RLE ROM: WFL  RLE Strength: WFL except limited hip strength 2/2 pain  LLE ROM: WFL  LLE Strength: WFL except limited hip strength 2/2 pain  Skin integrity: Visible skin intact      Functional Mobility:  Bed Mobility:     Supine to Sit: maximal assistance  Sit to Supine: maximal assistance  Transfers:     Sit to Stand:  maximal assistance and of 2 persons with rolling walker  Gait: Attempted but pt unable to weight shift and clear the contralateral foot.       AM-PAC 6 CLICK MOBILITY  Total Score:        Treatment & Education:      Patient and son/s were provided with verbal education education regarding PT role/goals/POC.  Understanding was verbalized.     Patient left HOB elevated with all lines intact, call button in reach, nurse notified, and son present.    GOALS:   Multidisciplinary Problems       Physical Therapy Goals          Problem: Physical Therapy    Goal Priority Disciplines Outcome Goal Variances Interventions   Physical Therapy Goal     PT, PT/OT Ongoing, Progressing     Description: Goals to be met by: 1/3/24     Patient will increase functional independence with mobility by performing:    Sit to supine with Modified [place]  Sit to stand transfer with Modified [place]  Tolerate gait assessment.                         History:     No past medical history on file.    No past surgical history on file.    Time Tracking:     PT Received On: 12/03/23  PT Start Time: 1110     PT Stop Time: 1135  PT Total Time (min): 25 min     Billable Minutes: Evaluation 25      12/03/2023

## 2023-12-03 NOTE — TERTIARY TRAUMA SURVEY NOTE
TERTIARY TRAUMA SURVEY (TTS)    List Injuries Identified to Date:   1. Lt sup/in comminuted pubic rami Fx with associated hematoma     [x]Problems list reviewed  List Operations and Procedures:   1. None     Incidental findings:   1. Umbilical hernia- fat containing     Past Medical History:   1. Cardiac valve disease  (per family declined surgery )  2. Afib on eliquis   3. Suspected dementia  4. Emphysema   Tertiary Physical Exam:     Physical Exam  Constitutional:       Comments: Chronically ill appearing   HENT:      Head: Normocephalic and atraumatic.      Right Ear: External ear normal.      Left Ear: External ear normal.      Nose: Nose normal.      Mouth/Throat:      Mouth: Mucous membranes are dry.      Pharynx: Oropharynx is clear.   Eyes:      Extraocular Movements: Extraocular movements intact.      Pupils: Pupils are equal, round, and reactive to light.   Cardiovascular:      Rate and Rhythm: Normal rate.   Pulmonary:      Effort: Pulmonary effort is normal.      Breath sounds: Normal breath sounds.   Abdominal:      General: Abdomen is flat.      Palpations: Abdomen is soft.   Musculoskeletal:      Cervical back: Normal range of motion and neck supple.      Comments: BLE generally tender to touch (chronic by report)   Skin:     General: Skin is warm and dry.      Comments: Chronic color change to BLE    Neurological:      General: No focal deficit present.      Mental Status: She is alert. She is disoriented.         Imaging Review:     Imaging Results              CT Chest Abdomen Pelvis With IV Contrast (XPD) NO Oral Contrast (Final result)  Result time 12/02/23 14:24:21      Final result by Js Schneider MD (12/02/23 14:24:21)                   Narrative:    EXAMINATION  CT CHEST ABDOMEN PELVIS WITH IV CONTRAST (XPD)    CLINICAL HISTORY  Level 2 trauma;  fall 2 days ago, initially presented to outside facility    TECHNIQUE  Post-contrast helical-acquisition CT images were obtained and  multiplanar reformats accomplished by a CT technologist at a separate workstation and pushed to PACS for physician review.    CONTRAST  *IV: ISOVUE-370, 100 mL  *Enteric: none    COMPARISON  No similar modality comparisons are available at the time of exam interpretation.    FINDINGS  Images were reviewed in soft tissue, lung, and bone windows.    Exam quality: Severely degraded secondary to constant patient movement and resulting artifact throughout the scan.    Lines/tubes: Lincoln catheter is well positioned within the urinary bladder.    Chest: The thyroid gland appear unremarkable. The heart size is within normal limits. Severe coronary artery calcification is seen.  No aortic dissection or aneurysm is seen. Moderate aortic calcification is seen in the thoracic aorta.  Pulmonary arteries are without evidence of large central filling defect. Moderate emphysematous changes are identified in the lungs. No focal consolidation is seen. A geographic area of ground-glass opacity is seen in the posterior aspect of the right upper lobe, which may reflect focal infiltrate or possibly lung contusion (series 3, image 37). No effusions or pneumothorax are identified.  The esophagus is unremarkable.    Abdomen: The liver, pancreas, and spleen are unremarkable. No intrahepatic or extrahepatic biliary duct dilatation is appreciated.  There are changes of cholecystectomy.  No acute or suspicious focal abnormality of the adrenal glands or kidneys. There is moderate calcification of the abdominal aorta and its branches.  The IVC and portal venous structures are normal in appearance.  No findings of high-grade gastrointestinal obstruction or acute inflammatory process. Multiple diverticula are seen in the colon, without associated inflammatory stranding or pericolonic fluid is seen to suggest diverticulitis. No intraperitoneal free air or ascites is seen.    Pelvis: The bladder is nondistended and cannot be definitively evaluated.  No adnexal masses are seen.  The uterus is unremarkable for CT assessment.    Musculoskeletal: There is a small umbilical hernia which contains mesenteric fat.  The bones are diffusely osteopenic. There are spondylitic changes throughout the visualized spinal column.  Severe compression deformity is seen involving L1 vertebral body with vertebra plana, overall favored to represent chronic injury but of relatively limited assessment secondary to patient/technical factors discussed above. There are comminuted displaced fractures involving the left superior and inferior pubic rami (series 2, images 167-183). There is a soft tissue hematoma in the extraperitoneal spaces along the left pelvic sidewall and left perivesical region, which measures approximately 3 x 1.7 cm (series 2, image 165). There is asymmetric intramuscular hematoma involving the left obturator internus muscle (series 2, image 169). There is extensive perivesical fat stranding. There is small amount of free fluid in the presacral spaces.    IMPRESSION  1. Comminuted fractures of the left pubic rami with associated pelvic sidewall and prevesical extraperitoneal hematoma.  2. Severe, likely chronic but otherwise age-indeterminate compression deformity of L1.  Widespread bone demineralization also present.  3. Severe calcific burden of the coronary arteries; widespread mild to moderate aortoiliac and branch vessel atherosclerotic changes.  4. Chronic bilateral lung parenchymal changes with focal ground-glass density at the posterior right upper lobe that is nonspecific; differential includes contusion given reported trauma or sequela of atypical infectious or inflammatory process.  5. No evidence of traumatic solid organ or hollow viscus injury through the abdomen and pelvis.  6. Chronic secondary details discussed above.  ==========    No significant discrepancy identified in relation to the teleradiology preliminary report.    RADIATION DOSE  Automated  tube current modulation, weight-based exposure dosing, and/or iterative reconstruction technique utilized to reach lowest reasonably achievable exposure rate.    DLP: 984 mGy*cm      Electronically signed by: Js Jennie  Date:    12/02/2023  Time:    14:24                      Preliminary result by Piero Nicolas MD (12/02/23 00:52:12)                   Impression:    1. Severe coronary artery calcification is seen.  2. There are comminuted displaced fractures involving the left superior and inferior pubic rami (series 2, images 167-183). There is a soft tissue hematoma in the extraperitoneal spaces of the left pelvis/ in the left perivesical region, which measures approximately 3 x 1.7 cm (series 2, image 165). There is asymmetric intramuscular hematoma involving the left obturator internus muscle (series 2, image 169). There is extensive perivesical fat stranding. There is small amount of free fluid in the presacral spaces.  3. Moderate emphysematous changes are identified in the lungs. No focal consolidation is seen. A geographic area of ground-glass opacity is seen in the posterior aspect of the right upper lobe, which may reflect focal infiltrate or possibly lung contusion (image 37 series 3). Follow-up as clinically indicated.  4. Details and other findings as discussed above.               Narrative:    START OF REPORT:  Technique: CT Scan of the chest abdomen and pelvis was performed with intravenous contrast with axial as well as sagittal and, coronal images.    Dosage Information: Automated Exposure Control was utilized.    Comparison: None.    Clinical History: Altered Mental Status (Fall 2 days ago [facility] to [facility] - dx w/ pelvic fx and uti, hallucinations present before fall 2 days ago - cont and worsening - family concerned for increased ams.    Findings:  Soft Tissues: Unremarkable.  Neck: The thyroid gland appear unremarkable.  Mediastinum: The mediastinal structures are within normal limits.  Heart:  The heart size is within normal limits. Severe coronary artery calcification is seen.  Aorta: No aortic dissection or aneurysm is seen. Moderate aortic calcification is seen in the thoracic aorta.  Pulmonary Arteries: Unremarkable.  Lungs: Moderate emphysematous changes are identified in the lungs. No focal consolidation is seen. A geographic area of ground-glass opacity is seen in the posterior aspect of the right upper lobe, which may reflect focal infiltrate or possibly lung contusion (image 37 series 3).  Pleura: No effusions or pneumothorax are identified.  Bony Structures:  Spine: Mild spondylolytic changes are seen in the thoracic spine.  Ribs: The ribs appear unremarkable.  Abdomen:  Abdominal Wall: There is a small umbilical hernia which contains mesenteric fat.  Liver: The liver appears unremarkable.  Biliary System: No intrahepatic or extrahepatic biliary duct dilatation is seen.  Gallbladder: Surgical clips are seen in the gallbladder fossa consistent with prior cholecystectomy.  Pancreas: The pancreas appears unremarkable.  Spleen: The spleen appears unremarkable.  Adrenals: The adrenal glands appear unremarkable.  Kidneys: The kidneys appear unremarkable with no stones cysts masses or hydronephrosis.  Aorta: There is moderate calcification of the abdominal aorta and its branches.  IVC: Unremarkable.  Bowel:  Esophagus: The visualized esophagus appears unremarkable.  Stomach: The stomach appears unremarkable.  Duodenum: Unremarkable appearing duodenum.  Small Bowel: The small bowel appears unremarkable.  Colon: Multiple diverticula are seen in the colon. No associated inflammatory stranding or pericolonic fluid is seen to suggest diverticulitis.  Appendix: The appendix is not identified but no inflammatory changes are seen in the right lower quadrant to suggest appendicitis.  Peritoneum: No intraperitoneal free air or ascites is seen.    Pelvis:  Bladder: The bladder is nondistended and cannot be  definitively evaluated. A clements catheter is in place.  Female:  Ovaries: No adnexal masses are seen.    Bony structures: The bones are osteopenic.  Dorsal Spine: There is moderate spondylosis of the visualized dorsal spine. Chronic compression deformity is seen involving L1 vertebral body with vertebra plana.  Bony Pelvis: There are comminuted displaced fractures involving the left superior and inferior pubic rami (series 2, images 167-183). There is a soft tissue hematoma in the extraperitoneal spaces of the left pelvis/ in the left perivesical region, which measures approximately 3 x 1.7 cm (series 2, image 165). There is asymmetric intramuscular hematoma involving the left obturator internus muscle (series 2, image 169). There is extensive perivesical fat stranding. There is small amount of free fluid in the presacral spaces.                                         CT Cervical Spine Without Contrast (Final result)  Result time 12/02/23 09:45:48      Final result by Jaxson Galindo MD (12/02/23 09:45:48)                   Impression:    Impression:    1. No acute cervical spine fracture dislocation or subluxation is seen.    2. Degenerative changes and other details as above.    No significant discrepancy with overnight report      Electronically signed by: Jaxson Galindo  Date:    12/02/2023  Time:    09:45               Narrative:      Technique:CT of the cervical spine was performed without intravenous contrast with axial as well as sagittal and coronal images.    Comparison:None.    Dosage Information:Automated exposure control was utilized.    Clinical history:None.    Findings:    Lung apices:The visualized lung apices appear unremarkable.    Spine:    Vertebral Fusion:There is degenerative fusion of C2 with C3 both at the posterior disc level as well as the bilateral facets.    Listhesis:There is grade I degenerative anterolisthesis C4 on C5.    Lordosis:The cervical lordosis is maintained.    Intervertebral  disc spaces:Multilevel loss of disc height is seen.    Osteophytes:Pronounced multilevel endplate osteophytes are seen.    Endplate Sclerosis:Moderate multilevel endplate sclerosis is seen.    Uncovertebral degenerative changes:Moderate multilevel uncovertebral joint arthrosis is seen.    Facet degenerative changes:Moderate multilevel facet degenerative changes are seen.    Fractures:No acute cervical spine fracture dislocation or subluxation is seen.    This exam does not exclude the possibility of intrathecal soft tissue, ligamentous or vascular injury                        Preliminary result by Piero Nicolas MD (12/02/23 00:48:21)                   Impression:    1. No acute cervical spine fracture dislocation or subluxation is seen.  2. Degenerative changes and other details as above.               Narrative:    START OF REPORT:  Technique: CT of the cervical spine was performed without intravenous contrast with axial as well as sagittal and coronal images.    Comparison: None.    Dosage Information: Automated Exposure Control was utilized 329.28 mGy.cm.    Clinical history: Altered Mental Status (Fall 2 days ago went to [facility] - dx w/ pelvic fx and uti, hallucinations present before fall 2 days ago - cont and worsening - family concerned for increased ams.    Findings:  Lung apices: Chest CT findings discussed separately.  Spine:  Spinal canal: The spinal canal appears unremarkable.  Spinal cord: The spinal cord appears unremarkable.  Mineralization: Within normal limits for age.  Scoliosis: No significant scoliosis is seen.  Vertebral Fusion: No vertebral fusion is identified.  Listhesis: No significant listhesis is identified.  Lordosis: The cervical lordosis is maintained.  Intervertebral disc spaces: Multilevel loss of disc height is seen.  Osteophytes: Mild multilevel endplate osteophytes are seen.  Endplate Sclerosis: Mild multilevel endplate sclerosis is seen.  Uncovertebral degenerative changes: Mild  multilevel uncovertebral joint arthrosis is seen.  Facet degenerative changes: Mild multilevel facet degenerative changes are seen.  Fractures: No acute cervical spine fracture dislocation or subluxation is seen.    Miscellaneous:  Soft Tissues: Unremarkable.                                         CT Head Without Contrast (Final result)  Result time 12/02/23 09:47:46      Final result by Jaxson Galindo MD (12/02/23 09:47:46)                   Impression:    Impression:    No acute intracranial traumatic injury identified. Details and other findings as noted above.    No significant discrepancy with overnight report.      Electronically signed by: Jaxson Galindo  Date:    12/02/2023  Time:    09:47               Narrative:      Technique:CT of the head was performed without intravenous contrast with axial as well as coronal and sagittal images.    Comparison:None.    Dosage Information:Automated Exposure Control was utilized 991.17 mGy.cm.    Clinical history:Altered Mental Status (Fall 2 days ago went to [facility] [place] - dx w/ pelvic fx and uti, hallucinations present before fall 2 days ago - cont and worsening - family concerned for increased ams.    Findings:    Hemorrhage:No acute intracranial hemorrhage is seen.    CSF spaces:The ventricles, sulci and basal cisterns all appear moderately prominent consistent with global cerebral atrophy.    Brain parenchyma:There is no acute large vessel territory infarct.  Marked microvascular change is seen in portions of the periventricular and deep white matter tracts.    Cerebellum:Unremarkable.    Vascular:Moderate atheromatous calcification of the intracranial arteries is seen.    Calvarium:No acute linear or depressed skull fracture is seen.    Maxillofacial Structures:    Paranasal sinuses:The visualized paranasal sinuses appear clear with no mucoperiosteal thickening or air fluid levels identified.                        Preliminary result by Piero Nicolas MD (12/02/23  00:47:15)                   Impression:    1. No acute intracranial traumatic injury identified. Details and other findings as noted above.               Narrative:    START OF REPORT:  Technique: CT of the head was performed without intravenous contrast with axial as well as coronal and sagittal images.    Comparison: None.    Dosage Information: Automated Exposure Control was utilized 991.17 mGy.cm.    Clinical history: Altered Mental Status (Fall 2 days ago went to [facility] [place] - dx w/ pelvic fx and uti, hallucinations present before fall 2 days ago - cont and worsening - family concerned for increased ams.    Findings:  Hemorrhage: No acute intracranial hemorrhage is seen.  CSF spaces: The ventricles, sulci and basal cisterns all appear moderately prominent consistent with global cerebral atrophy.  Brain parenchyma: There is preservation of the grey white junction throughout. Moderate microvascular change is seen in portions of the periventricular and deep white matter tracts.  Cerebellum: Unremarkable.  Vascular: Moderate atheromatous calcification of the intracranial arteries is seen.  Sella and skull base: The sella appears to be within normal limits for age.  Herniation: None.  Intracranial calcifications: Incidental note is made of bilateral choroid plexus calcification. Incidental note is made of some pineal region calcification.  Calvarium: No acute linear or depressed skull fracture is seen.    Maxillofacial Structures:  Paranasal sinuses: The visualized paranasal sinuses appear clear with no mucoperiosteal thickening or air fluid levels identified.  Orbits: The orbits appear unremarkable.  Zygomatic arches: The zygomatic arches are intact and unremarkable.  Temporal bones and mastoids: The temporal bones and mastoids appear unremarkable.  TMJ: The mandibular condyles appear normally placed with respect to the mandibular fossa.                                         X-Ray Chest AP Portable (Final result)  Result  "time 12/02/23 12:58:02      Final result by Jaxson Galindo MD (12/02/23 12:58:02)                   Impression:      Mild congestive process..      Electronically signed by: Jaxson Galindo  Date:    12/02/2023  Time:    12:58               Narrative:    EXAMINATION:  XR CHEST AP PORTABLE    CLINICAL HISTORY:  Altered mental status, unspecified    TECHNIQUE:  One view    COMPARISON:  July 15, 2013.    FINDINGS:  Cardiopericardial silhouette appearance is similar.  There are chronic interstitial changes of lungs with superimposed mild congestive process.  There is no focally dense consolidation, significant fluid within the pleural space or pneumothorax.                                       Lab Review:   CBC:  Recent Labs   Lab Result Units 12/01/23 2217 12/03/23  0411   WBC x10(3)/mcL 10.44 9.84   RBC x10(6)/mcL 2.94* 2.77*   Hgb g/dL 9.7* 8.8*   Hct % 29.1* 27.4*   Platelet x10(3)/mcL 165 195   MCV fL 99.0* 98.9*   MCH pg 33.0* 31.8*   MCHC g/dL 33.3 32.1*       CMP:  Recent Labs   Lab Result Units 12/01/23 2217 12/03/23  0411   Calcium Level Total mg/dL 9.1 8.6   Albumin Level g/dL 2.8*  --    Sodium Level mmol/L 136 134*   Potassium Level mmol/L 4.9 4.4   Carbon Dioxide mmol/L 22* 22*   Blood Urea Nitrogen mg/dL 24.8* 19.7   Creatinine mg/dL 1.45* 1.05*   Alkaline Phosphatase unit/L 114  --    Alanine Aminotransferase unit/L 22  --    Aspartate Aminotransferase unit/L 35*  --    Bilirubin Total mg/dL 1.1  --        Troponin:  No results for input(s): "TROPONINI" in the last 2160 hours.    ETOH:  No results for input(s): "ETHANOL" in the last 72 hours.     Urine Drug Screen:  No results for input(s): "COCAINE", "OPIATE", "BARBITURATE", "AMPHETAMINE", "FENTANYL", "CANNABINOIDS", "MDMA" in the last 72 hours.    Invalid input(s): "BENZODIAZEPINE", "PHENCYCLIDINE"   Plan:   83-year-old female with a history of a flutter on Eliquis and dementia who presents for evaluation of altered mental status and left pubic rami " fractures     Consults:   Orthopedic surgery   Therapy:  Physical Therapy  Occupational Therapy Weight bearing status:   RUE: WBAT  LUE: WBAT  RLE: WBAT  LLE: WBAT Precautions:  Fall, Delirium, and Standard   Seizure prophylaxis:  Not indicated. VTE prophylaxis:     Prophylactic Lovenox 40mg BID  GI prophylaxis:  Not indicated. On PPI at home.   Outpatient follow up:  Orthopedic surgery Disposition:  Pending progress with therapy         Scr improving, Continue mIVF, suspect prerenal SABINO. Was tachy overnight when IVF stopped   MMPC , increase scheduled tylenol   PT/OT eval   Home meds resumed as appropriate   Voiding trial today   Cardiac diet   VTE ppx with Lovenox  Dispo pending PT/OT     [facility] Del [facility]Conne[facility]   Trauma/Acute Care Surgery  [facility]  C: [phone]

## 2023-12-03 NOTE — TREATMENT PLAN
We are transferring care of Ms. Pepper Gardner to the Medical Hospitalists. Dr. Mc has assumed care a. No further trauma/surgical interventions indicated at this time. We have ordered Lovenox based on trauma dosing and would strongly recommend continuing this dose throughout the patients hospital stay. The patient is at high risk given patient age and recent trauma.   Thank you for assistance with the medical care of Pepper Gardner .

## 2023-12-04 LAB
ALBUMIN SERPL-MCNC: 2.4 G/DL (ref 3.4–4.8)
ALBUMIN/GLOB SERPL: 0.7 RATIO (ref 1.1–2)
ALP SERPL-CCNC: 99 UNIT/L (ref 40–150)
ALT SERPL-CCNC: 20 UNIT/L (ref 0–55)
AST SERPL-CCNC: 39 UNIT/L (ref 5–34)
AV INDEX (PROSTH): 0.31
AV MEAN GRADIENT: 30 MMHG
AV PEAK GRADIENT: 61 MMHG
AV VALVE AREA BY VELOCITY RATIO: 0.79 CM²
AV VALVE AREA: 0.89 CM²
AV VELOCITY RATIO: 0.28
BASOPHILS # BLD AUTO: 0.09 X10(3)/MCL
BASOPHILS NFR BLD AUTO: 1 %
BILIRUB SERPL-MCNC: 1.1 MG/DL
BUN SERPL-MCNC: 15.6 MG/DL (ref 9.8–20.1)
CALCIUM SERPL-MCNC: 8.6 MG/DL (ref 8.4–10.2)
CHLORIDE SERPL-SCNC: 104 MMOL/L (ref 98–107)
CO2 SERPL-SCNC: 21 MMOL/L (ref 23–31)
CREAT SERPL-MCNC: 0.83 MG/DL (ref 0.55–1.02)
CV ECHO LV RWT: 0.65 CM
DOP CALC AO PEAK VEL: 3.9 M/S
DOP CALC AO VTI: 70.4 CM
DOP CALC LVOT AREA: 2.8 CM2
DOP CALC LVOT DIAMETER: 1.9 CM
DOP CALC LVOT PEAK VEL: 1.09 M/S
DOP CALC LVOT STROKE VOLUME: 62.34 CM3
DOP CALC MV VTI: 27.7 CM
DOP CALCLVOT PEAK VEL VTI: 22 CM
E WAVE DECELERATION TIME: 161 MSEC
E/A RATIO: 1.65
E/E' RATIO: 7.86 M/S
ECHO LV POSTERIOR WALL: 1.08 CM (ref 0.6–1.1)
EOSINOPHIL # BLD AUTO: 0.01 X10(3)/MCL (ref 0–0.9)
EOSINOPHIL NFR BLD AUTO: 0.1 %
ERYTHROCYTE [DISTWIDTH] IN BLOOD BY AUTOMATED COUNT: 22.3 % (ref 11.5–17)
FRACTIONAL SHORTENING: 30 % (ref 28–44)
GFR SERPLBLD CREATININE-BSD FMLA CKD-EPI: >60 MLS/MIN/1.73/M2
GLOBULIN SER-MCNC: 3.6 GM/DL (ref 2.4–3.5)
GLUCOSE SERPL-MCNC: 113 MG/DL (ref 82–115)
HCT VFR BLD AUTO: 26.3 % (ref 37–47)
HGB BLD-MCNC: 8.7 G/DL (ref 12–16)
HR MV ECHO: 84 BPM
IMM GRANULOCYTES # BLD AUTO: 0.17 X10(3)/MCL (ref 0–0.04)
IMM GRANULOCYTES NFR BLD AUTO: 1.9 %
INTERVENTRICULAR SEPTUM: 1.08 CM (ref 0.6–1.1)
LEFT ATRIUM SIZE: 3.7 CM
LEFT ATRIUM VOLUME MOD: 51.6 CM3
LEFT INTERNAL DIMENSION IN SYSTOLE: 2.31 CM (ref 2.1–4)
LEFT VENTRICLE DIASTOLIC VOLUME: 44.8 ML
LEFT VENTRICLE SYSTOLIC VOLUME: 18.3 ML
LEFT VENTRICULAR INTERNAL DIMENSION IN DIASTOLE: 3.32 CM (ref 3.5–6)
LEFT VENTRICULAR MASS: 107.07 G
LV LATERAL E/E' RATIO: 6.71 M/S
LV SEPTAL E/E' RATIO: 9.5 M/S
LVOT MG: 3 MMHG
LVOT MV: 0.74 CM/S
LYMPHOCYTES # BLD AUTO: 1.06 X10(3)/MCL (ref 0.6–4.6)
LYMPHOCYTES NFR BLD AUTO: 12 %
MAGNESIUM SERPL-MCNC: 2.2 MG/DL (ref 1.6–2.6)
MCH RBC QN AUTO: 32.2 PG (ref 27–31)
MCHC RBC AUTO-ENTMCNC: 33.1 G/DL (ref 33–36)
MCV RBC AUTO: 97.4 FL (ref 80–94)
MONOCYTES # BLD AUTO: 0.73 X10(3)/MCL (ref 0.1–1.3)
MONOCYTES NFR BLD AUTO: 8.3 %
MV MEAN GRADIENT: 3 MMHG
MV PEAK A VEL: 0.69 M/S
MV PEAK E VEL: 1.14 M/S
MV PEAK GRADIENT: 8 MMHG
MV STENOSIS PRESSURE HALF TIME: 68 MS
MV VALVE AREA BY CONTINUITY EQUATION: 2.25 CM2
MV VALVE AREA P 1/2 METHOD: 3.24 CM2
NEUTROPHILS # BLD AUTO: 6.75 X10(3)/MCL (ref 2.1–9.2)
NEUTROPHILS NFR BLD AUTO: 76.7 %
NRBC BLD AUTO-RTO: 0.8 %
OHS LV EJECTION FRACTION SIMPSONS BIPLANE MOD: 67 %
PISA TR MAX VEL: 3.8 M/S
PLATELET # BLD AUTO: 198 X10(3)/MCL (ref 130–400)
PMV BLD AUTO: 11.9 FL (ref 7.4–10.4)
POTASSIUM SERPL-SCNC: 4.1 MMOL/L (ref 3.5–5.1)
PROT SERPL-MCNC: 6 GM/DL (ref 5.8–7.6)
RA PRESSURE ESTIMATED: 8 MMHG
RBC # BLD AUTO: 2.7 X10(6)/MCL (ref 4.2–5.4)
RV TB RVSP: 12 MMHG
SINUS: 3.2 CM
SODIUM SERPL-SCNC: 134 MMOL/L (ref 136–145)
TDI LATERAL: 0.17 M/S
TDI SEPTAL: 0.12 M/S
TDI: 0.15 M/S
TR MAX PG: 58 MMHG
TRICUSPID ANNULAR PLANE SYSTOLIC EXCURSION: 1.52 CM
TV REST PULMONARY ARTERY PRESSURE: 66 MMHG
WBC # SPEC AUTO: 8.81 X10(3)/MCL (ref 4.5–11.5)

## 2023-12-04 PROCEDURE — 83735 ASSAY OF MAGNESIUM: CPT | Performed by: INTERNAL MEDICINE

## 2023-12-04 PROCEDURE — 21400001 HC TELEMETRY ROOM

## 2023-12-04 PROCEDURE — 25000242 PHARM REV CODE 250 ALT 637 W/ HCPCS: Performed by: NURSE PRACTITIONER

## 2023-12-04 PROCEDURE — 25000003 PHARM REV CODE 250: Performed by: STUDENT IN AN ORGANIZED HEALTH CARE EDUCATION/TRAINING PROGRAM

## 2023-12-04 PROCEDURE — 63600175 PHARM REV CODE 636 W HCPCS: Performed by: PHYSICIAN ASSISTANT

## 2023-12-04 PROCEDURE — 97530 THERAPEUTIC ACTIVITIES: CPT | Mod: CQ

## 2023-12-04 PROCEDURE — 25000003 PHARM REV CODE 250: Performed by: NURSE PRACTITIONER

## 2023-12-04 PROCEDURE — 80053 COMPREHEN METABOLIC PANEL: CPT | Performed by: PHYSICIAN ASSISTANT

## 2023-12-04 PROCEDURE — 63600175 PHARM REV CODE 636 W HCPCS: Performed by: NURSE PRACTITIONER

## 2023-12-04 PROCEDURE — 25000003 PHARM REV CODE 250: Performed by: PHYSICIAN ASSISTANT

## 2023-12-04 PROCEDURE — 96372 THER/PROPH/DIAG INJ SC/IM: CPT | Performed by: STUDENT IN AN ORGANIZED HEALTH CARE EDUCATION/TRAINING PROGRAM

## 2023-12-04 PROCEDURE — 63600175 PHARM REV CODE 636 W HCPCS: Performed by: STUDENT IN AN ORGANIZED HEALTH CARE EDUCATION/TRAINING PROGRAM

## 2023-12-04 PROCEDURE — 97535 SELF CARE MNGMENT TRAINING: CPT | Mod: CO

## 2023-12-04 PROCEDURE — 97530 THERAPEUTIC ACTIVITIES: CPT | Mod: CO

## 2023-12-04 PROCEDURE — 27000221 HC OXYGEN, UP TO 24 HOURS

## 2023-12-04 PROCEDURE — 85025 COMPLETE CBC W/AUTO DIFF WBC: CPT | Performed by: PHYSICIAN ASSISTANT

## 2023-12-04 RX ADMIN — ENOXAPARIN SODIUM 40 MG: 40 INJECTION SUBCUTANEOUS at 09:12

## 2023-12-04 RX ADMIN — DIGOXIN 125 MCG: 125 TABLET ORAL at 09:12

## 2023-12-04 RX ADMIN — CEFTRIAXONE 1 G: 1 INJECTION, POWDER, FOR SOLUTION INTRAMUSCULAR; INTRAVENOUS at 04:12

## 2023-12-04 RX ADMIN — ENOXAPARIN SODIUM 40 MG: 40 INJECTION SUBCUTANEOUS at 10:12

## 2023-12-04 RX ADMIN — LIDOCAINE 5% 1 PATCH: 700 PATCH TOPICAL at 09:12

## 2023-12-04 RX ADMIN — DIPHENHYDRAMINE HYDROCHLORIDE 12.5 MG: 50 INJECTION INTRAMUSCULAR; INTRAVENOUS at 12:12

## 2023-12-04 RX ADMIN — PANTOPRAZOLE SODIUM 40 MG: 40 TABLET, DELAYED RELEASE ORAL at 09:12

## 2023-12-04 RX ADMIN — METHOCARBAMOL 500 MG: 500 TABLET ORAL at 09:12

## 2023-12-04 RX ADMIN — ACETAMINOPHEN 650 MG: 325 TABLET, FILM COATED ORAL at 06:12

## 2023-12-04 RX ADMIN — SODIUM CHLORIDE, POTASSIUM CHLORIDE, SODIUM LACTATE AND CALCIUM CHLORIDE: 600; 310; 30; 20 INJECTION, SOLUTION INTRAVENOUS at 10:12

## 2023-12-04 RX ADMIN — ACETAMINOPHEN 650 MG: 325 TABLET, FILM COATED ORAL at 12:12

## 2023-12-04 RX ADMIN — UMECLIDINIUM 62.5 MCG: 62.5 AEROSOL, POWDER ORAL at 09:12

## 2023-12-04 RX ADMIN — OXYCODONE HYDROCHLORIDE 5 MG: 5 TABLET ORAL at 12:12

## 2023-12-04 NOTE — PLAN OF CARE
Family would like therapy prior to ret. Home.  Ok with snff or swing bed   all above info from grand dgtr. Alexandra Light [phone].  PcP  Dr. Castelan in . Pt has dementia and progressively worsening in past 6 mos.  Kids live near by and are supportive. Spouse is caregiver.  Snf is first option

## 2023-12-04 NOTE — PT/OT/SLP PROGRESS
"Slp attempted to see pt for clinical bedside swallow evaluation this afternoon; however, pt refused to participate in PO intake stating her lunch was brought at "10AM." SLP observed lunch tray and food was remained warmed. Pt was provided with education but continued to refuse. RN notified. Will re-attempt a later time schedule permitting.   "

## 2023-12-04 NOTE — PLAN OF CARE
"12/4 1053- Called daughter, Pia's, cell to review observation (MOON) letter/ initial OBS status. Went to . Called hospital room phone in case someone is there w pt. No answer after 5 rings. Will attempt again later. ALLYN Tracey RN     1103 Ms. Palacio called back. LILLY letter/ OBS time reviewed. Questions answered. Said pt does have a supplement. Emailed NOAT- "Spoke w/ pt's daughter, Pia, to review LILLY. She says pt has Medicare supplement. I see a card in media mgr for what looks like an [employer] supplement. Pls investigate and add to encounter if needed." Emailed SSC to pls have letter signed, drop off copies, etc. ALLYN Tracey RN   "

## 2023-12-04 NOTE — PT/OT/SLP PROGRESS
Occupational Therapy   Treatment    Name: Pepper Gardner  MRN: [id number]  Admitting Diagnosis:  Closed displaced fracture of pelvis       Recommendations:     Recommended therapy intensity at discharge: Moderate Intensity Therapy   Discharge Equipment Recommendations:  bedside commode, hip kit  Barriers to discharge:       Assessment:     Pepper Gardner is a 83 y.o. female with a medical diagnosis of Closed displaced fracture of pelvis. Performance deficits affecting function are weakness, impaired endurance, impaired self care skills, impaired functional mobility, gait instability, impaired balance, decreased safety awareness, pain, decreased lower extremity function, impaired cognition.     Rehab Prognosis:  Good; patient would benefit from acute skilled OT services to address these deficits and reach maximum level of function.       Plan:     Patient to be seen 5 x/week to address the above listed problems via self-care/home management, therapeutic activities, therapeutic exercises  Plan of Care Expires: 01/03/24  Plan of Care Reviewed with: patient    Subjective     Pain/Comfort:  Location - Side 1: Bilateral  Location 1: hip  Pain Addressed 1: Reposition, Distraction    Objective:     Communicated with: RN prior to session.  Patient found supine with peripheral IV, oxygen, telemetry, bed alarm upon OT entry to room.    General Precautions: Standard, fall    Orthopedic Precautions:RUE weight bearing as tolerated, LUE weight bearing as tolerated  Braces: N/A  Respiratory Status: Nasal cannula, flow 3 L/min     Occupational Performance:     Bed Mobility:    Patient completed Rolling/Turning to Left with  moderate assistance and 2 persons  Patient completed Rolling/Turning to Right with moderate assistance and 2 persons  Patient completed Supine to Sit with moderate assistance and 2 persons     Functional Mobility/Transfers:  Patient completed Sit <> Stand Transfer with maximal assistance and of 2 persons  with   rolling walker  x2 trials.   Functional Mobility: performed stand step t/f from bed>chair with Mod x2 - Max A and RW. BLE noted to buckle.     Activities of Daily Living:  LE dressing: Max A to don/doff brief in supine. Able to assist with lateral rolling Mod A x2 and use of bed rails.     Therapeutic Positioning    OT interventions performed during the course of today's session in an effort to prevent and/or reduce acquired pressure injuries:   Therapeutic positioning was provided at the conclusion of session to offload all bony prominences for the prevention and/or reduction of pressure injuries    Skin assessment: all bony prominences were assessed    Findings: no redness or breakdown noted    [facility] 6 Click ADL:      Patient Education:  Patient provided with verbal education education regarding OT role/goals/POC, fall prevention, and safety awareness.  Understanding was verbalized, however additional teaching warranted.      Patient left up in chair with all lines intact, call button in reach, chair alarm on, and on miguel mat    GOALS:   Multidisciplinary Problems       Occupational Therapy Goals          Problem: Occupational Therapy    Goal Priority Disciplines Outcome Interventions   Occupational Therapy Goal     OT, PT/OT Ongoing, Progressing    Description: Goals to be met by: 1/3/23     Patient will increase functional independence with ADLs by performing:    UE Dressing with [place].  LE Dressing with Modified [place].  Grooming while seated at sink with [place].  Toileting from bedside commode with Minimal Assistance for hygiene and clothing management.   Stand pivot transfers with Minimal Assistance.  Squat pivot transfers with Supervision.  Toilet transfer to bedside commode with Supervision squat pivot.                         Time Tracking:     OT Date of Treatment: 12/04/23  OT Start Time: 1000  OT Stop Time: 1024  OT Total Time (min): 24 min    Billable Minutes:Self Care/Home Management  8  Therapeutic Activity 16    OT/CHRIS: CHRIS     Number of CHRIS visits since last OT visit: 1    12/4/2023

## 2023-12-04 NOTE — PLAN OF CARE
12/04/23 1744   Discharge Assessment   Assessment Type Discharge Planning Assessment   Confirmed/corrected address, phone number and insurance Yes   Confirmed Demographics Correct on Facesheet   Source of Information family   If unable to respond/provide information was family/caregiver contacted? Yes   Contact Name/Number grand laura Light   Does patient/caregiver understand observation status   (inpt)   Communicated JILLIAN with patient/caregiver Date not available/Unable to determine   Reason For Admission fall   fx pelvis   People in Home spouse   Facility Arrived From: home   Do you expect to return to your current living situation? No   Do you have help at home or someone to help you manage your care at home? Yes   Who are your caregiver(s) and their phone number(s)? spouse  christo   Prior to hospitilization cognitive status: Unable to Assess   Walking or Climbing Stairs ambulation difficulty, requires equipment;ambulation difficulty, dependent;stair climbing difficulty, assistance 1 person   Readmission within 30 days? No   Patient currently being followed by outpatient case management? No   Do you currently have service(s) that help you manage your care at home? No   Do you take prescription medications? Yes   Do you have prescription coverage? Yes   Coverage m[employer]icare   DME Needed Upon Discharge  none   Discharge Plan A Rehab   Discharge Plan B Skilled Nursing Facility   Social Connections   Are you , , , , never , or living with a partner?

## 2023-12-04 NOTE — PROGRESS NOTES
[facility]  Hospital Medicine Progress Note        Chief Complaint: Inpatient Follow-up for     HPI: 83 y.o. female with a past medical history of essential hypertension, hyperlipidemia, CAD, atrial flutter, valvular heart disease, and dementia presented to [facility] on 12/1/2023 for altered mental status.  Family reported altered mental status began 2 days ago with a fall from her chair.  Patient was seen at [facility] and diagnosed with a pelvic fracture and UTI.  Patient was discharged home with antibiotics and Norco.  Daughter reported worsening confusion and disorientation, prompting them to [facility] ED. initial vital signs upon arrival to ED were /58, pulse 119, respirations 20, temperature 36.7° C, and SpO2 96% on room air.  Labs revealed WBC 10.44, RBC 2.94, hemoglobin 9.7, hematocrit 29.1, MCV 99, CO2 22, BUN 24.8, creatinine 1.45, glucose 135, and lactic acid 1.2.  UA revealed trace occult blood, 25 leukocytes, 11-20 red blood cells, and 6-10 white blood cells.  Blood cultures were obtained.  CT head without contrast revealed no acute intracranial traumatic abnormality.  CT cervical spine revealed no acute cervical spine fracture, dislocation, or subluxation seen with degenerative changes.  CT chest, abdomen, and pelvis with IV contrast revealed comminuted fractures of the left pubic rami with associated pelvic sidewall and prevesical extraperitoneal hematoma, severe likely chronic age indeterminate compression deformity of L1, severe calcific burden of the coronary arteries with widespread mild-to-moderate aortoiliac and branch vessel atherosclerotic changes, and chronic bilateral lung parenchymal changes with focal ground-glass density at the posterior right upper lobe that is nonspecific.  Chest x-ray revealed mild congestive process.  Patient was given LR and IV Rocephin 2 g in ED.  Orthopedics was consulted.  Patient was admitted to trauma services.  Orthopedics recommended  nonoperative treatment with weight-bearing as tolerated.  PT and OT were consulted.  Hospital Medicine was consulted for transition of care and further medical management on 12/3    Interval Hx:   Patient seen and examined this morning no family member bedside    Objective/physical exam:  General: In no acute distress, afebrile  Chest: Clear to auscultation bilaterally  Heart: RRR, +S1, S2, no appreciable murmur  Abdomen: Soft, nontender, BS +  MSK: Warm, no lower extremity edema, no clubbing or cyanosis  Neurologic: Alert and awake   VITAL SIGNS: 24 HRS MIN & MAX LAST   Temp  Min: 97.6 °F (36.4 °C)  Max: 98.6 °F (37 °C) 97.6 °F (36.4 °C)   BP  Min: 95/58  Max: 108/67 (!) 107/59   Pulse  Min: 82  Max: 100  91   Resp  Min: 16  Max: 19 18   SpO2  Min: 90 %  Max: 96 % (!) 92 %     I have reviewed the following labs:  Recent Labs   Lab 12/01/23 2217 12/03/23 0411 12/04/23  0534   WBC 10.44 9.84 8.81   RBC 2.94* 2.77* 2.70*   HGB 9.7* 8.8* 8.7*   HCT 29.1* 27.4* 26.3*   MCV 99.0* 98.9* 97.4*   MCH 33.0* 31.8* 32.2*   MCHC 33.3 32.1* 33.1   RDW 22.6* 22.4* 22.3*    195 198   MPV  --  12.8* 11.9*     Recent Labs   Lab 12/01/23 2217 12/03/23 0411 12/04/23  0534    134* 134*   K 4.9 4.4 4.1   CO2 22* 22* 21*   BUN 24.8* 19.7 15.6   CREATININE 1.45* 1.05* 0.83   CALCIUM 9.1 8.6 8.6   MG 2.10  --  2.20   ALBUMIN 2.8*  --  2.4*   ALKPHOS 114  --  99   ALT 22  --  20   AST 35*  --  39*   BILITOT 1.1  --  1.1     Microbiology Results (last 7 days)       Procedure Component Value Units Date/Time    Blood culture #1 **CANNOT BE ORDERED STAT** [[id number]]  (Normal) Collected: 12/01/23 2217    Order Status: Completed Specimen: Blood Updated: 12/03/23 2300     CULTURE, BLOOD ([facility]) No Growth At 48 Hours    Blood culture #2 **CANNOT BE ORDERED STAT** [[id number]]  (Normal) Collected: 12/01/23 2217    Order Status: Completed Specimen: Blood Updated: 12/03/23 2300     CULTURE, BLOOD ([facility]) No Growth At 48 Hours              See below for Radiology    Scheduled Med:   acetaminophen  650 mg Oral Q6H    cefTRIAXone (ROCEPHIN) IVPB  1 g Intravenous Q24H    digoxin  125 mcg Oral Daily    enoxparin  40 mg Subcutaneous Q12H (prophylaxis, 0900/2100)    LIDOcaine  1 patch Transdermal Q24H    methocarbamoL  500 mg Oral Daily    metoprolol succinate  12.5 mg Oral BID    pantoprazole  40 mg Oral Daily    umeclidinium  1 capsule Inhalation Daily      Continuous Infusions:   lactated ringers 100 mL/hr at 12/03/23 0100      PRN Meds:  acetaminophen, budesonide, diphenhydrAMINE, fluticasone furoate-vilanteroL **AND** umeclidinium, levalbuterol, LIDOcaine (PF) 20 mg/mL (2%), melatonin, ondansetron, oxyCODONE, oxyCODONE     Assessment/Plan:    Comminuted fracture of the left pubic rami with associated pelvic sidewall and previous cycle extraperitoneal hematoma   L1 severe likely chronic compression fracture  Focal ground-glass opacities in the right upper lobe that is nonspecific possibly contusion versus atypical infectious process  UTI  History of AFib  Essential hypertension  Hyperlipidemia   Valvular heart disease   Dementia      Orthopedics was consulted they recommended non operative management and weight-bearing as tolerated and continuing PT and to follow-up with them in 4 weeks after discharge  On Rocephin for possible UTI   Eliquis on hold, continue with other home medication  Continue with PT and OT  Will consult Neurosurgery because of severe L1 compression fracture  Continue with other home medications  Trauma surgery has signed off      VTE prophylaxis: lovenox    Patient condition:  Stable/Fair/Guarded/ Serious/ Critical    Anticipated discharge and Disposition:         All diagnosis and differential diagnosis have been reviewed; assessment and plan has been documented; I have personally reviewed the labs and test results that are presently available; I have reviewed the patients medication list; I have reviewed the consulting  providers response and recommendations. I have reviewed or attempted to review medical records based upon their availability    All of the patient's questions have been  addressed and answered. Patient's is agreeable to the above stated plan. I will continue to monitor closely and make adjustments to medical management as needed.  _____________________________________________________________________    Nutrition Status:    Radiology:  I have personally reviewed the following imaging and agree with the radiologist.     CT Chest Abdomen Pelvis With IV Contrast (XPD) NO Oral Contrast  EXAMINATION  CT CHEST ABDOMEN PELVIS WITH IV CONTRAST (XPD)    CLINICAL HISTORY  Level 2 trauma;  fall 2 days ago, initially presented to outside facility    TECHNIQUE  Post-contrast helical-acquisition CT images were obtained and multiplanar reformats accomplished by a CT technologist at a separate workstation and pushed to PACS for physician review.    CONTRAST  *IV: ISOVUE-370, 100 mL  *Enteric: none    COMPARISON  No similar modality comparisons are available at the time of exam interpretation.    FINDINGS  Images were reviewed in soft tissue, lung, and bone windows.    Exam quality: Severely degraded secondary to constant patient movement and resulting artifact throughout the scan.    Lines/tubes: Lincoln catheter is well positioned within the urinary bladder.    Chest: The thyroid gland appear unremarkable. The heart size is within normal limits. Severe coronary artery calcification is seen.  No aortic dissection or aneurysm is seen. Moderate aortic calcification is seen in the thoracic aorta.  Pulmonary arteries are without evidence of large central filling defect. Moderate emphysematous changes are identified in the lungs. No focal consolidation is seen. A geographic area of ground-glass opacity is seen in the posterior aspect of the right upper lobe, which may reflect focal infiltrate or possibly lung contusion (series 3, image  37). No effusions or pneumothorax are identified.  The esophagus is unremarkable.    Abdomen: The liver, pancreas, and spleen are unremarkable. No intrahepatic or extrahepatic biliary duct dilatation is appreciated.  There are changes of cholecystectomy.  No acute or suspicious focal abnormality of the adrenal glands or kidneys. There is moderate calcification of the abdominal aorta and its branches.  The IVC and portal venous structures are normal in appearance.  No findings of high-grade gastrointestinal obstruction or acute inflammatory process. Multiple diverticula are seen in the colon, without associated inflammatory stranding or pericolonic fluid is seen to suggest diverticulitis. No intraperitoneal free air or ascites is seen.    Pelvis: The bladder is nondistended and cannot be definitively evaluated. No adnexal masses are seen.  The uterus is unremarkable for CT assessment.    Musculoskeletal: There is a small umbilical hernia which contains mesenteric fat.  The bones are diffusely osteopenic. There are spondylitic changes throughout the visualized spinal column.  Severe compression deformity is seen involving L1 vertebral body with vertebra plana, overall favored to represent chronic injury but of relatively limited assessment secondary to patient/technical factors discussed above. There are comminuted displaced fractures involving the left superior and inferior pubic rami (series 2, images 167-183). There is a soft tissue hematoma in the extraperitoneal spaces along the left pelvic sidewall and left perivesical region, which measures approximately 3 x 1.7 cm (series 2, image 165). There is asymmetric intramuscular hematoma involving the left obturator internus muscle (series 2, image 169). There is extensive perivesical fat stranding. There is small amount of free fluid in the presacral spaces.    IMPRESSION  1. Comminuted fractures of the left pubic rami with associated pelvic sidewall and prevesical  extraperitoneal hematoma.  2. Severe, likely chronic but otherwise age-indeterminate compression deformity of L1.  Widespread bone demineralization also present.  3. Severe calcific burden of the coronary arteries; widespread mild to moderate aortoiliac and branch vessel atherosclerotic changes.  4. Chronic bilateral lung parenchymal changes with focal ground-glass density at the posterior right upper lobe that is nonspecific; differential includes contusion given reported trauma or sequela of atypical infectious or inflammatory process.  5. No evidence of traumatic solid organ or hollow viscus injury through the abdomen and pelvis.  6. Chronic secondary details discussed above.  ==========    No significant discrepancy identified in relation to the teleradiology preliminary report.    RADIATION DOSE  Automated tube current modulation, weight-based exposure dosing, and/or iterative reconstruction technique utilized to reach lowest reasonably achievable exposure rate.    DLP: 984 mGy*cm    Electronically signed by: Js Schneider  Date:    12/02/2023  Time:    14:24  X-Ray Chest AP Portable  Narrative: EXAMINATION:  XR CHEST AP PORTABLE    CLINICAL HISTORY:  Altered mental status, unspecified    TECHNIQUE:  One view    COMPARISON:  July 15, 2013.    FINDINGS:  Cardiopericardial silhouette appearance is similar.  There are chronic interstitial changes of lungs with superimposed mild congestive process.  There is no focally dense consolidation, significant fluid within the pleural space or pneumothorax.  Impression: Mild congestive process..    Electronically signed by: Jaxson Galindo  Date:    12/02/2023  Time:    12:58  CT Head Without Contrast  Narrative: Technique:CT of the head was performed without intravenous contrast with axial as well as coronal and sagittal images.    Comparison:None.    Dosage Information:Automated Exposure Control was utilized 991.17 mGy.cm.    Clinical history:Altered Mental Status (Fall 2  days ago [place] [facility] [place] w/ pelvic fx and uti, hallucinations present before fall 2 days ago - cont and worsening - family concerned for increased ams.    Findings:    Hemorrhage:No acute intracranial hemorrhage is seen.    CSF spaces:The ventricles, sulci and basal cisterns all appear moderately prominent consistent with global cerebral atrophy.    Brain parenchyma:There is no acute large vessel territory infarct.  Marked microvascular change is seen in portions of the periventricular and deep white matter tracts.    Cerebellum:Unremarkable.    Vascular:Moderate atheromatous calcification of the intracranial arteries is seen.    Calvarium:No acute linear or depressed skull fracture is seen.    Maxillofacial Structures:    Paranasal sinuses:The visualized paranasal sinuses appear clear with no mucoperiosteal thickening or air fluid levels identified.  Impression: Impression:    No acute intracranial traumatic injury identified. Details and other findings as noted above.    No significant discrepancy with overnight report.    Electronically signed by: Jaxson Galindo  Date:    12/02/2023  Time:    09:47  CT Cervical Spine Without Contrast  Narrative: Technique:CT of the cervical spine was performed without intravenous contrast with axial as well as sagittal and coronal images.    Comparison:None.    Dosage Information:Automated exposure control was utilized.    Clinical history:None.    Findings:    Lung apices:The visualized lung apices appear unremarkable.    Spine:    Vertebral Fusion:There is degenerative fusion of C2 with C3 both at the posterior disc level as well as the bilateral facets.    Listhesis:There is grade I degenerative anterolisthesis C4 on C5.    Lordosis:The cervical lordosis is maintained.    Intervertebral disc spaces:Multilevel loss of disc height is seen.    Osteophytes:Pronounced multilevel endplate osteophytes are seen.    Endplate Sclerosis:Moderate multilevel endplate sclerosis is  seen.    Uncovertebral degenerative changes:Moderate multilevel uncovertebral joint arthrosis is seen.    Facet degenerative changes:Moderate multilevel facet degenerative changes are seen.    Fractures:No acute cervical spine fracture dislocation or subluxation is seen.    This exam does not exclude the possibility of intrathecal soft tissue, ligamentous or vascular injury  Impression: Impression:    1. No acute cervical spine fracture dislocation or subluxation is seen.    2. Degenerative changes and other details as above.    No significant discrepancy with overnight report    Electronically signed by: Jaxson Galindo  Date:    12/02/2023  Time:    09:45      Ashley Sherman MD   12/04/2023

## 2023-12-04 NOTE — PLAN OF CARE
12/04/23 1731   Discharge Assessment   Confirmed/corrected address, phone number and insurance Yes   Confirmed Demographics Correct on Facesheet   Source of Information family   If unable to respond/provide information was family/caregiver contacted? Yes   Contact Name/Number Alexandra Light [phone]   Does patient/caregiver understand observation status   (inpatient)   Communicated JILLIAN with patient/caregiver Date not available/Unable to determine   Reason For Admission fall, fx pelvis   People in Home spouse   Facility Arrived From: home   Do you expect to return to your current living situation? No   Do you have help at home or someone to help you manage your care at home? Yes   Who are your caregiver(s) and their phone number(s)? elderly spouse and pt's kids   Prior to hospitilization cognitive status: Unable to Assess   Current cognitive status: Not Oriented to Person;Not Oriented to Place   Walking or Climbing Stairs ambulation difficulty, requires equipment   Dressing/Bathing bathing difficulty, requires equipment   Equipment Currently Used at Home bedside commode;bath bench;walker, rolling;wheelchair;cane, quad   Readmission within 30 days? No   Patient currently being followed by outpatient case management? No   Do you currently have service(s) that help you manage your care at home? No   Do you take prescription medications? Yes   Do you have prescription coverage? Yes   Coverage medicare   Do you have any problems affording any of your prescribed medications? No   Who is going to help you get home at discharge? family   How do you get to doctors appointments? family or friend will provide   DME Needed Upon Discharge  none   Discharge Plan discussed with: Adult children   Transition of Care Barriers None   Discharge Plan A Skilled Nursing Facility   Discharge Plan B Skilled Nursing Facility   Social Connections   Are you , , , , never , or living with a partner?     Alcohol Use   Q1: How often do you have a drink containing alcohol? Never   Q2: How many drinks containing alcohol do you have on a typical day when you are drinking? None   OTHER   Name(s) of People in Home spouse, christo Sears     Family would like therapy prior to ret. Home.  Ok with snff or swing bed   all above info from grand dgtr. Alexandra Juniornahed [phone].  PcP  Dr. Castelan in . Pt has dementia and progressively worsening in past 6 mos.  Kids live near by and are supportive. Spouse is caregiver.  Snf is first option

## 2023-12-05 LAB
ANION GAP SERPL CALC-SCNC: 9 MEQ/L
BASOPHILS # BLD AUTO: 0.07 X10(3)/MCL
BASOPHILS NFR BLD AUTO: 0.8 %
BUN SERPL-MCNC: 12.8 MG/DL (ref 9.8–20.1)
CALCIUM SERPL-MCNC: 8.4 MG/DL (ref 8.4–10.2)
CHLORIDE SERPL-SCNC: 107 MMOL/L (ref 98–107)
CO2 SERPL-SCNC: 22 MMOL/L (ref 23–31)
CREAT SERPL-MCNC: 0.71 MG/DL (ref 0.55–1.02)
CREAT/UREA NIT SERPL: 18
EOSINOPHIL # BLD AUTO: 0.11 X10(3)/MCL (ref 0–0.9)
EOSINOPHIL NFR BLD AUTO: 1.2 %
ERYTHROCYTE [DISTWIDTH] IN BLOOD BY AUTOMATED COUNT: 22.8 % (ref 11.5–17)
GFR SERPLBLD CREATININE-BSD FMLA CKD-EPI: >60 MLS/MIN/1.73/M2
GLUCOSE SERPL-MCNC: 89 MG/DL (ref 82–115)
HCT VFR BLD AUTO: 30.2 % (ref 37–47)
HGB BLD-MCNC: 9.7 G/DL (ref 12–16)
IMM GRANULOCYTES # BLD AUTO: 0.21 X10(3)/MCL (ref 0–0.04)
IMM GRANULOCYTES NFR BLD AUTO: 2.4 %
LYMPHOCYTES # BLD AUTO: 1.36 X10(3)/MCL (ref 0.6–4.6)
LYMPHOCYTES NFR BLD AUTO: 15.4 %
MCH RBC QN AUTO: 32.3 PG (ref 27–31)
MCHC RBC AUTO-ENTMCNC: 32.1 G/DL (ref 33–36)
MCV RBC AUTO: 100.7 FL (ref 80–94)
MONOCYTES # BLD AUTO: 0.77 X10(3)/MCL (ref 0.1–1.3)
MONOCYTES NFR BLD AUTO: 8.7 %
NEUTROPHILS # BLD AUTO: 6.33 X10(3)/MCL (ref 2.1–9.2)
NEUTROPHILS NFR BLD AUTO: 71.5 %
NRBC BLD AUTO-RTO: 0.9 %
PLATELET # BLD AUTO: 226 X10(3)/MCL (ref 130–400)
PMV BLD AUTO: 12.2 FL (ref 7.4–10.4)
POTASSIUM SERPL-SCNC: 4.1 MMOL/L (ref 3.5–5.1)
RBC # BLD AUTO: 3 X10(6)/MCL (ref 4.2–5.4)
SODIUM SERPL-SCNC: 138 MMOL/L (ref 136–145)
WBC # SPEC AUTO: 8.85 X10(3)/MCL (ref 4.5–11.5)

## 2023-12-05 PROCEDURE — 97110 THERAPEUTIC EXERCISES: CPT | Mod: CQ

## 2023-12-05 PROCEDURE — 80048 BASIC METABOLIC PNL TOTAL CA: CPT | Performed by: INTERNAL MEDICINE

## 2023-12-05 PROCEDURE — 21400001 HC TELEMETRY ROOM

## 2023-12-05 PROCEDURE — 92610 EVALUATE SWALLOWING FUNCTION: CPT

## 2023-12-05 PROCEDURE — 97116 GAIT TRAINING THERAPY: CPT | Mod: CQ

## 2023-12-05 PROCEDURE — 25000003 PHARM REV CODE 250: Performed by: NURSE PRACTITIONER

## 2023-12-05 PROCEDURE — 25000242 PHARM REV CODE 250 ALT 637 W/ HCPCS: Performed by: NURSE PRACTITIONER

## 2023-12-05 PROCEDURE — 97530 THERAPEUTIC ACTIVITIES: CPT | Mod: CQ

## 2023-12-05 PROCEDURE — 25000003 PHARM REV CODE 250: Performed by: PHYSICIAN ASSISTANT

## 2023-12-05 PROCEDURE — 63600175 PHARM REV CODE 636 W HCPCS: Performed by: PHYSICIAN ASSISTANT

## 2023-12-05 PROCEDURE — 63600175 PHARM REV CODE 636 W HCPCS: Performed by: STUDENT IN AN ORGANIZED HEALTH CARE EDUCATION/TRAINING PROGRAM

## 2023-12-05 PROCEDURE — 25000003 PHARM REV CODE 250: Performed by: STUDENT IN AN ORGANIZED HEALTH CARE EDUCATION/TRAINING PROGRAM

## 2023-12-05 PROCEDURE — 85025 COMPLETE CBC W/AUTO DIFF WBC: CPT | Performed by: INTERNAL MEDICINE

## 2023-12-05 RX ADMIN — METOPROLOL SUCCINATE 12.5 MG: 25 TABLET, EXTENDED RELEASE ORAL at 08:12

## 2023-12-05 RX ADMIN — METHOCARBAMOL 500 MG: 500 TABLET ORAL at 08:12

## 2023-12-05 RX ADMIN — Medication 6 MG: at 08:12

## 2023-12-05 RX ADMIN — PANTOPRAZOLE SODIUM 40 MG: 40 TABLET, DELAYED RELEASE ORAL at 08:12

## 2023-12-05 RX ADMIN — LIDOCAINE 5% 1 PATCH: 700 PATCH TOPICAL at 05:12

## 2023-12-05 RX ADMIN — CEFTRIAXONE 1 G: 1 INJECTION, POWDER, FOR SOLUTION INTRAMUSCULAR; INTRAVENOUS at 05:12

## 2023-12-05 RX ADMIN — SODIUM CHLORIDE, POTASSIUM CHLORIDE, SODIUM LACTATE AND CALCIUM CHLORIDE: 600; 310; 30; 20 INJECTION, SOLUTION INTRAVENOUS at 08:12

## 2023-12-05 RX ADMIN — ACETAMINOPHEN 650 MG: 325 TABLET, FILM COATED ORAL at 05:12

## 2023-12-05 RX ADMIN — ENOXAPARIN SODIUM 40 MG: 40 INJECTION SUBCUTANEOUS at 08:12

## 2023-12-05 RX ADMIN — DIGOXIN 125 MCG: 125 TABLET ORAL at 08:12

## 2023-12-05 RX ADMIN — UMECLIDINIUM 62.5 MCG: 62.5 AEROSOL, POWDER ORAL at 08:12

## 2023-12-05 RX ADMIN — ACETAMINOPHEN 650 MG: 325 TABLET, FILM COATED ORAL at 11:12

## 2023-12-05 RX ADMIN — FLUTICASONE FUROATE AND VILANTEROL TRIFENATATE 1 PUFF: 100; 25 POWDER RESPIRATORY (INHALATION) at 08:12

## 2023-12-05 NOTE — PROGRESS NOTES
[facility]  Hospital Medicine Progress Note        Chief Complaint: Inpatient Follow-up for fracture    HPI:   83 y.o. female with a past medical history of essential hypertension, hyperlipidemia, CAD, atrial flutter, valvular heart disease, and dementia presented to [facility] on 12/1/2023 for altered mental status.  Family reported altered mental status began 2 days ago with a fall from her chair.  Patient was seen at [facility] and diagnosed with a pelvic fracture and UTI.  Patient was discharged home with antibiotics and Norco.  Daughter reported worsening confusion and disorientation, prompting them to [facility] ED. initial vital signs upon arrival to ED were /58, pulse 119, respirations 20, temperature 36.7° C, and SpO2 96% on room air.  Labs revealed WBC 10.44, RBC 2.94, hemoglobin 9.7, hematocrit 29.1, MCV 99, CO2 22, BUN 24.8, creatinine 1.45, glucose 135, and lactic acid 1.2.  UA revealed trace occult blood, 25 leukocytes, 11-20 red blood cells, and 6-10 white blood cells.  Blood cultures were obtained.  CT head without contrast revealed no acute intracranial traumatic abnormality.  CT cervical spine revealed no acute cervical spine fracture, dislocation, or subluxation seen with degenerative changes.  CT chest, abdomen, and pelvis with IV contrast revealed comminuted fractures of the left pubic rami with associated pelvic sidewall and prevesical extraperitoneal hematoma, severe likely chronic age indeterminate compression deformity of L1, severe calcific burden of the coronary arteries with widespread mild-to-moderate aortoiliac and branch vessel atherosclerotic changes, and chronic bilateral lung parenchymal changes with focal ground-glass density at the posterior right upper lobe that is nonspecific.  Chest x-ray revealed mild congestive process.  Patient was given LR and IV Rocephin 2 g in ED.  Orthopedics was consulted.  Patient was admitted to trauma services.  Orthopedics  recommended nonoperative treatment with weight-bearing as tolerated.  PT and OT were consulted.  Hospital Medicine was consulted for transition of care and further medical management on 12/3.      Interval Hx:   Afebrile.  Blood pressure borderline low but map above 65.  She is saturating about 90% with 2 L nasal cannula oxygen.  Lethargic resting comfortably.  No family members were seen at bedside.  She is tolerating PT    Labs today showed hemoglobin stable with macrocytosis, stable platelets and electrolytes.      Objective/physical exam:  Vitals:    12/05/23 0721 12/05/23 0851 12/05/23 0852 12/05/23 1120   BP: 113/76   106/70   Pulse: 91 96 96 89   Resp: 18 18 18 18   Temp: 97.6 °F (36.4 °C)   98.1 °F (36.7 °C)   TempSrc: Oral   Oral   SpO2: (!) 90%   (!) 92%     General: In no acute distress, afebrile  Respiratory: Clear to auscultation bilaterally  Cardiovascular: S1, S2, no appreciable murmur  Abdomen: Soft, nontender, BS +  MSK: Warm, no lower extremity edema, no clubbing or cyanosis  Neurologic: Alert and oriented x4, moving all extremities         Lab Results   Component Value Date     12/05/2023    K 4.1 12/05/2023    CO2 22 (L) 12/05/2023    BUN 12.8 12/05/2023    CREATININE 0.71 12/05/2023    CALCIUM 8.4 12/05/2023      Lab Results   Component Value Date    ALT 20 12/04/2023    AST 39 (H) 12/04/2023    ALKPHOS 99 12/04/2023    BILITOT 1.1 12/04/2023      Lab Results   Component Value Date    WBC 8.85 12/05/2023    HGB 9.7 (L) 12/05/2023    HCT 30.2 (L) 12/05/2023    .7 (H) 12/05/2023     12/05/2023           Medications:   acetaminophen  650 mg Oral Q6H    cefTRIAXone (ROCEPHIN) IVPB  1 g Intravenous Q24H    digoxin  125 mcg Oral Daily    enoxparin  40 mg Subcutaneous Q12H (prophylaxis, 0900/2100)    LIDOcaine  1 patch Transdermal Q24H    methocarbamoL  500 mg Oral Daily    metoprolol succinate  12.5 mg Oral BID    pantoprazole  40 mg Oral Daily    umeclidinium  1 capsule Inhalation  Daily      acetaminophen, budesonide, diphenhydrAMINE, fluticasone furoate-vilanteroL **AND** umeclidinium, levalbuterol, LIDOcaine (PF) 20 mg/mL (2%), melatonin, ondansetron, oxyCODONE, oxyCODONE     Assessment/Plan:    Comminuted fracture of the left pubic rami with associated pelvic sidewall and prevescical extraperitoneal hematoma   Age indeterminate L1 compression deformity   Focal ground-glass opacities in the right upper lobe that is nonspecific possibly contusion versus atypical infectious process    HX:  AFib, HTN, HLD, VHD, dementia, O2 dependent COPD, osteopenia, diverticulosis    Plan:  -continue therapy as tolerated.  Analgesics as needed  -HGB stable.  Eliquis on hold due to extraperitoneal hematoma seen on imaging.  Will resume eventually  -continue digoxin and metoprolol at current doses.  Monitor for hypotension.  Continue telemetry.  Eliquis on hold  -neurosurgery consulted for further input regarding L1 compression fracture  -continue oxygen supplementation.  Continue bronchodilator therapies  -other home medications were reviewed and renewed      Natyx    Andrew Mi MD

## 2023-12-05 NOTE — PT/OT/SLP PROGRESS
Physical Therapy Treatment    Patient Name:  Pepper Gardner   MRN:  [id number]    Recommendations:     Discharge therapy intensity: Moderate Intensity Therapy   Discharge Equipment Recommendations: to be determined by next level of care  Barriers to discharge: Decreased caregiver support and Impaired mobility    Assessment:     Pepper Gardner is a 83 y.o. female admitted with a medical diagnosis of Closed displaced fracture of pelvis.  She presents with the following impairments/functional limitations: weakness, impaired endurance, impaired functional mobility, gait instability.    Rehab Prognosis: Good; patient would benefit from acute skilled PT services to address these deficits and reach maximum level of function.    Recent Surgery: * No surgery found *      Plan:     During this hospitalization, patient to be seen 6 x/week to address the identified rehab impairments via gait training, therapeutic activities, therapeutic exercises, neuromuscular re-education and progress toward the following goals:    Plan of Care Expires:  01/03/24    Subjective     Chief Complaint: pelvic pain    Objective:     Communicated with nurse prior to session.  Patient found supine with   upon PT entry to room.     General Precautions: Standard,    Orthopedic Precautions: LLE weight bearing as tolerated, RLE weight bearing as tolerated  Braces:    Respiratory Status: Nasal cannula, flow . L/min  Blood Pressure:   Skin Integrity: Visible skin intact      Functional Mobility:  Mod assist to get EOB and max assist to stand. Gait 3 ft with RW max assist WBAT. Pt performed LE PRE's to increase strenth, ROM, and endurance to improve overall independence. Pt sat ~2 hours and assisted BTB with mod assist an improvement from yesterdays max/total assist.     Education Provided:  Role and goals of PT, transfer training, bed mobility, gait training, balance training, safety awareness, assistive device, strengthening exercises, and importance of  participating in PT to return to PLOF.    Patient left up in chair with all lines intact, call button in reach, and chair alarm on..    GOALS:   Multidisciplinary Problems       Physical Therapy Goals          Problem: Physical Therapy    Goal Priority Disciplines Outcome Goal Variances Interventions   Physical Therapy Goal     PT, PT/OT Ongoing, Progressing     Description: Goals to be met by: 1/3/24     Patient will increase functional independence with mobility by performing:    Sit to supine with Modified [place]  Sit to stand transfer with Modified [place]  Tolerate gait assessment.                         Time Tracking:     Billable Minutes: Gait Training 10 and Therapeutic Exercise 13    Treatment Type: Treatment  PT/PTA: PTA     Number of PTA visits since last PT visit: 2     12/05/2023

## 2023-12-05 NOTE — PT/OT/SLP EVAL
[facility]  Speech Language Pathology Department  Clinical Swallow Evaluation    Patient Name:  Pepper Gardner   MRN:  [id number]    Recommendations:     General recommendations:  SLP intervention not indicated  Diet texture/consistency recommendations:  Regular solids (IDDSI 7) and thin liquids (IDDSI 0)  Medications: per patient preference  Swallow strategies/precautions: upright for PO intake and feed only when fully alert  Precautions: Standard, fall    History:     Pepper Gardner is a/n 83 y.o. female a past medical history of essential hypertension, hyperlipidemia, CAD, atrial flutter, valvular heart disease, and dementia presented to [facility] on 12/1/2023 for altered mental status.     Prior Intubation HX:  NA    Home diet texture/consistency: Regular and thin liquids  Current method of nutrition: PO diet (reg/thin)    Patient complaint: Pt with no complaints at this time.     Subjective     Patient alert and cooperative.    Patient goals: to eat and drink by mouth     Spiritual/Cultural/Quaker Beliefs/Practices that affect care: no  Pain/Comfort: Pain Rating 1: 0/10    Restraints/positioning devices: none    Objective:     ORAL MUSCULATURE  Dentition: upper dentures and lower dentures  Secretion Management: adequate  Mucosal Quality: good  Facial Movement: WFL  Buccal Strength & Mobility: WFL  Mandibular Strength & Mobility: WFL  Oral Labial Strength & Mobility: WFL  Lingual Strength & Mobility: WFL  Velar Elevation: WFL  Vocal Quality: adequate  Volitional Cough:  NA    Consistency Fed By Oral Symptoms Pharyngeal Symptoms   Thin liquid by cup Self None None   Puree Self None None   Chewable solid Self None None   Thin liquid by straw Self None None     Assessment:   Pt with WFL oropharyngeal swallow function with no clinical, overt signs/symptoms of aspiration. Pt has been on her recommended diet and tolerating without difficulties per medical staff. Will sign off as no further  skill services are warranted at this time.     Patient Education:     Patient provided with verbal education regarding results/recommendations.  Understanding was verbalized.    Plan:     SLP Follow-Up:  No      Time Tracking:     SLP Treatment Date:   12/05/23  Speech Start Time:  1300  Speech Stop Time:  1315     Speech Total Time (min):  15 min    Billable minutes:  Swallow and Oral Function Evaluation, 15 minutes     12/05/2023

## 2023-12-06 LAB
ALBUMIN SERPL-MCNC: 2.4 G/DL (ref 3.4–4.8)
ALBUMIN/GLOB SERPL: 0.7 RATIO (ref 1.1–2)
ALP SERPL-CCNC: 112 UNIT/L (ref 40–150)
ALT SERPL-CCNC: 32 UNIT/L (ref 0–55)
AST SERPL-CCNC: 51 UNIT/L (ref 5–34)
BACTERIA BLD CULT: NORMAL
BACTERIA BLD CULT: NORMAL
BASOPHILS # BLD AUTO: 0.09 X10(3)/MCL
BASOPHILS NFR BLD AUTO: 1.1 %
BILIRUB SERPL-MCNC: 1 MG/DL
BUN SERPL-MCNC: 10.2 MG/DL (ref 9.8–20.1)
CALCIUM SERPL-MCNC: 8.4 MG/DL (ref 8.4–10.2)
CHLORIDE SERPL-SCNC: 104 MMOL/L (ref 98–107)
CO2 SERPL-SCNC: 25 MMOL/L (ref 23–31)
CREAT SERPL-MCNC: 0.64 MG/DL (ref 0.55–1.02)
DEPRECATED CALCIDIOL+CALCIFEROL SERPL-MC: 41.4 NG/ML (ref 30–80)
EOSINOPHIL # BLD AUTO: 0.12 X10(3)/MCL (ref 0–0.9)
EOSINOPHIL NFR BLD AUTO: 1.5 %
ERYTHROCYTE [DISTWIDTH] IN BLOOD BY AUTOMATED COUNT: 22.6 % (ref 11.5–17)
FERRITIN SERPL-MCNC: 1300.11 NG/ML (ref 4.63–204)
FOLATE SERPL-MCNC: 5.9 NG/ML (ref 7–31.4)
GFR SERPLBLD CREATININE-BSD FMLA CKD-EPI: >60 MLS/MIN/1.73/M2
GLOBULIN SER-MCNC: 3.6 GM/DL (ref 2.4–3.5)
GLUCOSE SERPL-MCNC: 102 MG/DL (ref 82–115)
HCT VFR BLD AUTO: 29.1 % (ref 37–47)
HGB BLD-MCNC: 9.5 G/DL (ref 12–16)
IMM GRANULOCYTES # BLD AUTO: 0.19 X10(3)/MCL (ref 0–0.04)
IMM GRANULOCYTES NFR BLD AUTO: 2.4 %
IRON SATN MFR SERPL: 53 % (ref 20–50)
IRON SERPL-MCNC: 78 UG/DL (ref 50–170)
LYMPHOCYTES # BLD AUTO: 1.25 X10(3)/MCL (ref 0.6–4.6)
LYMPHOCYTES NFR BLD AUTO: 15.7 %
MAGNESIUM SERPL-MCNC: 2.2 MG/DL (ref 1.6–2.6)
MCH RBC QN AUTO: 32.4 PG (ref 27–31)
MCHC RBC AUTO-ENTMCNC: 32.6 G/DL (ref 33–36)
MCV RBC AUTO: 99.3 FL (ref 80–94)
MONOCYTES # BLD AUTO: 0.67 X10(3)/MCL (ref 0.1–1.3)
MONOCYTES NFR BLD AUTO: 8.4 %
NEUTROPHILS # BLD AUTO: 5.65 X10(3)/MCL (ref 2.1–9.2)
NEUTROPHILS NFR BLD AUTO: 70.9 %
NRBC BLD AUTO-RTO: 1.4 %
PLATELET # BLD AUTO: 245 X10(3)/MCL (ref 130–400)
PMV BLD AUTO: 11.8 FL (ref 7.4–10.4)
POTASSIUM SERPL-SCNC: 3.5 MMOL/L (ref 3.5–5.1)
PROT SERPL-MCNC: 6 GM/DL (ref 5.8–7.6)
RBC # BLD AUTO: 2.93 X10(6)/MCL (ref 4.2–5.4)
SODIUM SERPL-SCNC: 136 MMOL/L (ref 136–145)
TIBC SERPL-MCNC: 147 UG/DL (ref 250–450)
TIBC SERPL-MCNC: 69 UG/DL (ref 70–310)
TRANSFERRIN SERPL-MCNC: 135 MG/DL
VIT B12 SERPL-MCNC: 624 PG/ML (ref 213–816)
WBC # SPEC AUTO: 7.97 X10(3)/MCL (ref 4.5–11.5)

## 2023-12-06 PROCEDURE — 83735 ASSAY OF MAGNESIUM: CPT | Performed by: INTERNAL MEDICINE

## 2023-12-06 PROCEDURE — 80053 COMPREHEN METABOLIC PANEL: CPT | Performed by: INTERNAL MEDICINE

## 2023-12-06 PROCEDURE — 83540 ASSAY OF IRON: CPT | Performed by: INTERNAL MEDICINE

## 2023-12-06 PROCEDURE — 82728 ASSAY OF FERRITIN: CPT | Performed by: INTERNAL MEDICINE

## 2023-12-06 PROCEDURE — 25000003 PHARM REV CODE 250: Performed by: INTERNAL MEDICINE

## 2023-12-06 PROCEDURE — 82607 VITAMIN B-12: CPT | Performed by: INTERNAL MEDICINE

## 2023-12-06 PROCEDURE — 63600175 PHARM REV CODE 636 W HCPCS: Performed by: PHYSICIAN ASSISTANT

## 2023-12-06 PROCEDURE — 82746 ASSAY OF FOLIC ACID SERUM: CPT | Performed by: INTERNAL MEDICINE

## 2023-12-06 PROCEDURE — 97530 THERAPEUTIC ACTIVITIES: CPT | Mod: CO

## 2023-12-06 PROCEDURE — 21400001 HC TELEMETRY ROOM

## 2023-12-06 PROCEDURE — 82306 VITAMIN D 25 HYDROXY: CPT | Performed by: INTERNAL MEDICINE

## 2023-12-06 PROCEDURE — 25000003 PHARM REV CODE 250: Performed by: STUDENT IN AN ORGANIZED HEALTH CARE EDUCATION/TRAINING PROGRAM

## 2023-12-06 PROCEDURE — 25000003 PHARM REV CODE 250: Performed by: NURSE PRACTITIONER

## 2023-12-06 PROCEDURE — 25000242 PHARM REV CODE 250 ALT 637 W/ HCPCS: Performed by: NURSE PRACTITIONER

## 2023-12-06 PROCEDURE — 97116 GAIT TRAINING THERAPY: CPT | Mod: CQ

## 2023-12-06 PROCEDURE — 97530 THERAPEUTIC ACTIVITIES: CPT | Mod: CQ

## 2023-12-06 PROCEDURE — 85025 COMPLETE CBC W/AUTO DIFF WBC: CPT | Performed by: INTERNAL MEDICINE

## 2023-12-06 PROCEDURE — 27000221 HC OXYGEN, UP TO 24 HOURS

## 2023-12-06 PROCEDURE — 25000003 PHARM REV CODE 250: Performed by: PHYSICIAN ASSISTANT

## 2023-12-06 PROCEDURE — 63600175 PHARM REV CODE 636 W HCPCS: Performed by: STUDENT IN AN ORGANIZED HEALTH CARE EDUCATION/TRAINING PROGRAM

## 2023-12-06 PROCEDURE — 97535 SELF CARE MNGMENT TRAINING: CPT | Mod: CO

## 2023-12-06 RX ORDER — FOLIC ACID 1 MG/1
1 TABLET ORAL DAILY
Status: DISCONTINUED | OUTPATIENT
Start: 2023-12-06 | End: 2023-12-12 | Stop reason: HOSPADM

## 2023-12-06 RX ORDER — QUETIAPINE FUMARATE 25 MG/1
25 TABLET, FILM COATED ORAL NIGHTLY
Status: DISCONTINUED | OUTPATIENT
Start: 2023-12-06 | End: 2023-12-08

## 2023-12-06 RX ADMIN — UMECLIDINIUM 62.5 MCG: 62.5 AEROSOL, POWDER ORAL at 09:12

## 2023-12-06 RX ADMIN — METHOCARBAMOL 500 MG: 500 TABLET ORAL at 08:12

## 2023-12-06 RX ADMIN — ENOXAPARIN SODIUM 40 MG: 40 INJECTION SUBCUTANEOUS at 09:12

## 2023-12-06 RX ADMIN — OXYCODONE HYDROCHLORIDE 10 MG: 10 TABLET ORAL at 09:12

## 2023-12-06 RX ADMIN — CEFTRIAXONE 1 G: 1 INJECTION, POWDER, FOR SOLUTION INTRAMUSCULAR; INTRAVENOUS at 03:12

## 2023-12-06 RX ADMIN — LIDOCAINE 5% 1 PATCH: 700 PATCH TOPICAL at 08:12

## 2023-12-06 RX ADMIN — DIGOXIN 125 MCG: 125 TABLET ORAL at 08:12

## 2023-12-06 RX ADMIN — FOLIC ACID 1 MG: 1 TABLET ORAL at 01:12

## 2023-12-06 RX ADMIN — ENOXAPARIN SODIUM 40 MG: 40 INJECTION SUBCUTANEOUS at 08:12

## 2023-12-06 RX ADMIN — QUETIAPINE FUMARATE 25 MG: 25 TABLET ORAL at 09:12

## 2023-12-06 RX ADMIN — ACETAMINOPHEN 650 MG: 325 TABLET, FILM COATED ORAL at 08:12

## 2023-12-06 RX ADMIN — METOPROLOL SUCCINATE 12.5 MG: 25 TABLET, EXTENDED RELEASE ORAL at 08:12

## 2023-12-06 RX ADMIN — PANTOPRAZOLE SODIUM 40 MG: 40 TABLET, DELAYED RELEASE ORAL at 08:12

## 2023-12-06 RX ADMIN — ACETAMINOPHEN 650 MG: 325 TABLET, FILM COATED ORAL at 02:12

## 2023-12-06 NOTE — PT/OT/SLP PROGRESS
Occupational Therapy   Treatment    Name: Pepper Gardner  MRN: [id number]  Admitting Diagnosis:  Closed displaced fracture of pelvis       Recommendations:     Recommended therapy intensity at discharge: Moderate Intensity Therapy   Discharge Equipment Recommendations:  bedside commode, hip kit  Barriers to discharge:       Assessment:     Pepper Gardner is a 83 y.o. female with a medical diagnosis of Closed displaced fracture of pelvis. Performance deficits affecting function are weakness, impaired endurance, impaired cognition, impaired self care skills, impaired functional mobility, decreased lower extremity function, gait instability, impaired balance, decreased safety awareness, pain. Pt appears confused today requiring more cues for attention to tasks and redirection. Tolerated well overall however limited by pain.     Rehab Prognosis:  Good; patient would benefit from acute skilled OT services to address these deficits and reach maximum level of function.       Plan:     Patient to be seen 5 x/week to address the above listed problems via self-care/home management, therapeutic activities, therapeutic exercises  Plan of Care Expires: 01/03/24  Plan of Care Reviewed with: patient    Subjective     Pain/Comfort:  Location - Side 1: Bilateral  Location 1: hip  Pain Addressed 1: Reposition, Distraction    Objective:     Communicated with: RN prior to session.  Patient found right sidelying with oxygen, telemetry, bed alarm upon OT entry to room.    General Precautions: Standard, fall    Orthopedic Precautions:RLE weight bearing as tolerated, LLE weight bearing as tolerated  Braces: N/A  Respiratory Status: Nasal cannula, flow 2 L/min     Occupational Performance:     Bed Mobility:    Patient completed Rolling/Turning to Left with  moderate assistance and 2 persons  Patient completed Rolling/Turning to Right with moderate assistance and 2 persons  Patient completed Supine to Sit with moderate assistance and 2  persons  Patient completed Sit to Supine with maximal assistance and 2 persons     Functional Mobility/Transfers:  Patient completed Sit <> Stand Transfer with moderate assistance  with  rolling walker  able to stand for ~2 minutes EOB for pericare.    Activities of Daily Living:  Grooming: completed oral hygiene seated EOB with set-up A and cues for sequencing task. CGA for sitting balance during task.   Toileting: total A posterior pericare at bed level 2/2 BM. Needed Mod A x2 for rolling in supine. Cues for use of bed rails. Will order BSC for practice in future sessions.     Therapeutic Positioning    OT interventions performed during the course of today's session in an effort to prevent and/or reduce acquired pressure injuries:   Education was provided on benefits of and recommendations for therapeutic positioning    Skin assessment: all bony prominences were assessed    Findings: no redness or breakdown noted    [facility] 6 Click ADL:      Patient Education:  Patient provided with verbal education education regarding OT role/goals/POC and safety awareness.  Additional teaching is warranted.      Patient left supine with all lines intact, call button in reach, bed alarm on, and family present    GOALS:   Multidisciplinary Problems       Occupational Therapy Goals          Problem: Occupational Therapy    Goal Priority Disciplines Outcome Interventions   Occupational Therapy Goal     OT, PT/OT Ongoing, Progressing    Description: Goals to be met by: 1/3/23     Patient will increase functional independence with ADLs by performing:    UE Dressing with [place].  LE Dressing with Modified [place].  Grooming while seated at sink with [place].  Toileting from bedside commode with Minimal Assistance for hygiene and clothing management.   Stand pivot transfers with Minimal Assistance.  Squat pivot transfers with Supervision.  Toilet transfer to bedside commode with Supervision squat pivot.                          Time Tracking:     OT Date of Treatment: 12/06/23  OT Start Time: 1309  OT Stop Time: 1336  OT Total Time (min): 27 min    Billable Minutes:Self Care/Home Management 20  Therapeutic Activity 7    OT/CHRIS: CHRIS     Number of CHRIS visits since last OT visit: 2    12/6/2023

## 2023-12-06 NOTE — PLAN OF CARE
Problem: Adult Inpatient Plan of Care  Goal: Plan of Care Review  Outcome: Ongoing, Progressing  Goal: Patient-Specific Goal (Individualized)  Outcome: Ongoing, Progressing  Goal: Absence of Hospital-Acquired Illness or Injury  Outcome: Ongoing, Progressing  Goal: Optimal Comfort and Wellbeing  Outcome: Ongoing, Progressing  Goal: Readiness for Transition of Care  Outcome: Ongoing, Progressing     Problem: Skin Injury Risk Increased  Goal: Skin Health and Integrity  Outcome: Ongoing, Progressing     Problem: Fluid and Electrolyte Imbalance (Acute Kidney Injury/Impairment)  Goal: Fluid and Electrolyte Balance  Outcome: Ongoing, Progressing     Problem: Oral Intake Inadequate (Acute Kidney Injury/Impairment)  Goal: Optimal Nutrition Intake  Outcome: Ongoing, Progressing

## 2023-12-06 NOTE — PROGRESS NOTES
[facility]  Hospital Medicine Progress Note        Chief Complaint: Inpatient Follow-up for fracture    HPI:   83 y.o. female with a past medical history of essential hypertension, hyperlipidemia, CAD, atrial flutter, valvular heart disease, and dementia presented to [facility] on 12/1/2023 for altered mental status.  Family reported altered mental status began 2 days ago with a fall from her chair.  Patient [place] [facility] [place] diagnosed with a pelvic fracture and UTI.  Patient was discharged home with antibiotics and Norco.  Daughter reported worsening confusion and disorientation, prompting them to [facility] ED. initial vital signs upon arrival to ED were /58, pulse 119, respirations 20, temperature 36.7° C, and SpO2 96% on room air.  Labs revealed WBC 10.44, RBC 2.94, hemoglobin 9.7, hematocrit 29.1, MCV 99, CO2 22, BUN 24.8, creatinine 1.45, glucose 135, and lactic acid 1.2.  UA revealed trace occult blood, 25 leukocytes, 11-20 red blood cells, and 6-10 white blood cells.  Blood cultures were obtained.  CT head without contrast revealed no acute intracranial traumatic abnormality.  CT cervical spine revealed no acute cervical spine fracture, dislocation, or subluxation seen with degenerative changes.  CT chest, abdomen, and pelvis with IV contrast revealed comminuted fractures of the left pubic rami with associated pelvic sidewall and prevesical extraperitoneal hematoma, severe likely chronic age indeterminate compression deformity of L1, severe calcific burden of the coronary arteries with widespread mild-to-moderate aortoiliac and branch vessel atherosclerotic changes, and chronic bilateral lung parenchymal changes with focal ground-glass density at the posterior right upper lobe that is nonspecific.  Chest x-ray revealed mild congestive process.  Patient was given LR and IV Rocephin 2 g in ED.  Orthopedics was consulted.  Patient was admitted to trauma services.  Orthopedics  recommended nonoperative treatment with weight-bearing as tolerated.  PT and OT were consulted.  Hospital Medicine was consulted for transition of care and further medical management on 12/3.      Interval Hx:   Afebrile.  Hemodynamically stable.  Confused, agitated and was pulling off IV lines overnight.  Soft restraints were placed overnight.  When seen at bedside she was alert, disoriented x3.  No family members were seen at bedside.  Monitor labs showed low folate, stable hemoglobin and platelets.    Objective/physical exam:  Vitals:    12/05/23 0721 12/05/23 0851 12/05/23 0852 12/05/23 1120   BP: 113/76   106/70   Pulse: 91 96 96 89   Resp: 18 18 18 18   Temp: 97.6 °F (36.4 °C)   98.1 °F (36.7 °C)   TempSrc: Oral   Oral   SpO2: (!) 90%   (!) 92%     General: In no acute distress, afebrile  Respiratory: Clear to auscultation bilaterally  Cardiovascular: S1, S2, no appreciable murmur  Abdomen: Soft, nontender, BS +  MSK: Warm, no lower extremity edema, no clubbing or cyanosis  Neurologic: Alert and oriented x4, moving all extremities         Lab Results   Component Value Date     12/05/2023    K 4.1 12/05/2023    CO2 22 (L) 12/05/2023    BUN 12.8 12/05/2023    CREATININE 0.71 12/05/2023    CALCIUM 8.4 12/05/2023      Lab Results   Component Value Date    ALT 20 12/04/2023    AST 39 (H) 12/04/2023    ALKPHOS 99 12/04/2023    BILITOT 1.1 12/04/2023      Lab Results   Component Value Date    WBC 8.85 12/05/2023    HGB 9.7 (L) 12/05/2023    HCT 30.2 (L) 12/05/2023    .7 (H) 12/05/2023     12/05/2023           Medications:   acetaminophen  650 mg Oral Q6H    cefTRIAXone (ROCEPHIN) IVPB  1 g Intravenous Q24H    digoxin  125 mcg Oral Daily    enoxparin  40 mg Subcutaneous Q12H (prophylaxis, 0900/2100)    LIDOcaine  1 patch Transdermal Q24H    methocarbamoL  500 mg Oral Daily    metoprolol succinate  12.5 mg Oral BID    pantoprazole  40 mg Oral Daily    umeclidinium  1 capsule Inhalation Daily       acetaminophen, budesonide, diphenhydrAMINE, fluticasone furoate-vilanteroL **AND** umeclidinium, levalbuterol, LIDOcaine (PF) 20 mg/mL (2%), melatonin, ondansetron, oxyCODONE, oxyCODONE     Assessment/Plan:    Hospital-acquired delirium on dementia  Comminuted fracture of the left pubic rami with associated pelvic sidewall and prevescical extraperitoneal hematoma   Age indeterminate L1 compression deformity   Focal ground-glass opacities in the right upper lobe that is nonspecific possibly contusion versus atypical infectious process  Macrocytic anemia, folate deficiency    HX:  AFib, HTN, HLD, VHD, dementia, O2 dependent COPD, osteopenia, diverticulosis    Plan:  -will add low-dose Seroquel for night.  Continue soft restraints.   -HGB stable.  We will consider resuming Eliquis eventually  -continue digoxin and metoprolol at current doses.  Monitor for hypotension.  Continue telemetry.  Eliquis on hold  -we will consult neurosurgery for further input regarding L1 compression fracture  -continue oxygen supplementation.  Continue bronchodilator therapies  -other home medications were reviewed and renewed  .  Add folate  -needs placement    Lovenox    Andrew Mi MD

## 2023-12-06 NOTE — PT/OT/SLP PROGRESS
Physical Therapy Treatment    Patient Name:  Pepper Gardner   MRN:  [id number]    Recommendations:     Discharge therapy intensity: Moderate Intensity Therapy   Discharge Equipment Recommendations: to be determined by next level of care  Barriers to discharge: Impaired mobility.     Assessment:     Pepper Gardner is a 83 y.o. female admitted with a medical diagnosis of Closed displaced fracture of pelvis.  She presents with the following impairments/functional limitations: weakness, impaired endurance, impaired functional mobility, gait instability .    Rehab Prognosis: Fair; patient would benefit from acute skilled PT services to address these deficits and reach maximum level of function.    Recent Surgery: * No surgery found *      Plan:     During this hospitalization, patient to be seen 6 x/week to address the identified rehab impairments via gait training, therapeutic activities, therapeutic exercises, neuromuscular re-education and progress toward the following goals:    Plan of Care Expires:  01/03/24    Subjective     Chief Complaint: Pain in pelvis    Objective:     Communicated with nurse prior to session.  Patient found supine with   upon PT entry to room.     General Precautions: Standard,    Orthopedic Precautions: LLE weight bearing as tolerated, RLE weight bearing as tolerated  Braces:    Respiratory Status: Nasal cannula, flow . L/min  Blood Pressure:   Skin Integrity: Visible skin intact      Functional Mobility:  Mod assist rolling and max assist to get EOB. Mod to stand and pt ambulated 3 ft and 2 ft with RW. Additional static standing but too weak and confused to ambulate any further. Completed session with grooming EOB SBA.     Education Provided:  Role and goals of PT, transfer training, bed mobility, gait training, balance training, safety awareness, assistive device, strengthening exercises, and importance of participating in PT to return to PLOF.      Patient left right sidelying with all  lines intact, call button in reach, and granddaughter present..    GOALS:   Multidisciplinary Problems       Physical Therapy Goals          Problem: Physical Therapy    Goal Priority Disciplines Outcome Goal Variances Interventions   Physical Therapy Goal     PT, PT/OT Ongoing, Progressing     Description: Goals to be met by: 1/3/24     Patient will increase functional independence with mobility by performing:    Sit to supine with Modified [place]  Sit to stand transfer with Modified [place]  Tolerate gait assessment.                         Time Tracking:     Billable Minutes: Gait Training 10 and Therapeutic Activity 15    Treatment Type: Treatment  PT/PTA: PTA     Number of PTA visits since last PT visit: 3     12/06/2023

## 2023-12-07 PROBLEM — E44.0 MODERATE MALNUTRITION: Status: ACTIVE | Noted: 2023-12-07

## 2023-12-07 PROCEDURE — 25000003 PHARM REV CODE 250: Performed by: INTERNAL MEDICINE

## 2023-12-07 PROCEDURE — 27000221 HC OXYGEN, UP TO 24 HOURS

## 2023-12-07 PROCEDURE — 21400001 HC TELEMETRY ROOM

## 2023-12-07 PROCEDURE — 97530 THERAPEUTIC ACTIVITIES: CPT | Mod: CQ

## 2023-12-07 PROCEDURE — 63600175 PHARM REV CODE 636 W HCPCS: Performed by: STUDENT IN AN ORGANIZED HEALTH CARE EDUCATION/TRAINING PROGRAM

## 2023-12-07 PROCEDURE — 25000003 PHARM REV CODE 250: Performed by: STUDENT IN AN ORGANIZED HEALTH CARE EDUCATION/TRAINING PROGRAM

## 2023-12-07 PROCEDURE — 25000003 PHARM REV CODE 250: Performed by: NURSE PRACTITIONER

## 2023-12-07 PROCEDURE — 97110 THERAPEUTIC EXERCISES: CPT | Mod: CQ

## 2023-12-07 PROCEDURE — 97116 GAIT TRAINING THERAPY: CPT | Mod: CQ

## 2023-12-07 RX ADMIN — METHOCARBAMOL 500 MG: 500 TABLET ORAL at 08:12

## 2023-12-07 RX ADMIN — Medication 6 MG: at 09:12

## 2023-12-07 RX ADMIN — DIGOXIN 125 MCG: 125 TABLET ORAL at 08:12

## 2023-12-07 RX ADMIN — ENOXAPARIN SODIUM 40 MG: 40 INJECTION SUBCUTANEOUS at 09:12

## 2023-12-07 RX ADMIN — ENOXAPARIN SODIUM 40 MG: 40 INJECTION SUBCUTANEOUS at 08:12

## 2023-12-07 RX ADMIN — PANTOPRAZOLE SODIUM 40 MG: 40 TABLET, DELAYED RELEASE ORAL at 08:12

## 2023-12-07 RX ADMIN — QUETIAPINE FUMARATE 25 MG: 25 TABLET ORAL at 09:12

## 2023-12-07 RX ADMIN — ACETAMINOPHEN 650 MG: 325 TABLET, FILM COATED ORAL at 09:12

## 2023-12-07 RX ADMIN — METOPROLOL SUCCINATE 12.5 MG: 25 TABLET, EXTENDED RELEASE ORAL at 09:12

## 2023-12-07 RX ADMIN — FOLIC ACID 1 MG: 1 TABLET ORAL at 08:12

## 2023-12-07 RX ADMIN — OXYCODONE HYDROCHLORIDE 5 MG: 5 TABLET ORAL at 08:12

## 2023-12-07 RX ADMIN — METOPROLOL SUCCINATE 12.5 MG: 25 TABLET, EXTENDED RELEASE ORAL at 08:12

## 2023-12-07 NOTE — CONSULTS
Inpatient Nutrition Assessment    Admit Date: 12/1/2023   Total duration of encounter: 6 days   Patient Age: 83 y.o.    Nutrition Recommendation/Prescription     Continue heart healthy diet as tolerated  RD to order Boost Plus BID to provide 360 kcal and 14 g protein per serving  Continue folic acid supplement as medically feasible  RD to monitor po intake and weight    Communication of Recommendations: reviewed with patient and reviewed with family    Nutrition Assessment     Malnutrition Assessment/Nutrition-Focused Physical Exam    Malnutrition Context: acute illness or injury (12/07/23 1217)  Malnutrition Level: moderate (12/07/23 1217)  Energy Intake (Malnutrition): less than or equal to 50% for greater than or equal to 1 month (12/07/23 1217)  Weight Loss (Malnutrition): other (see comments) (does not meet criteria) (12/07/23 1217)  Subcutaneous Fat (Malnutrition): other (see comments) (does not meet criteria) (12/07/23 1217)           Muscle Mass (Malnutrition): mild depletion (12/07/23 1217)  Islam Region (Muscle Loss): mild depletion     Clavicle and Acromion Bone Region (Muscle Loss): mild depletion     Dorsal Hand (Muscle Loss): mild depletion                    A minimum of two characteristics is recommended for diagnosis of either severe or non-severe malnutrition.    Chart Review    Reason Seen: length of stay and physician consult for family request    Malnutrition Screening Tool Results   Have you recently lost weight without trying?: No  Have you been eating poorly because of a decreased appetite?: No   MST Score: 0   Diagnosis:  Hospital-acquired delirium on dementia  Comminuted fracture of the left pubic rami with associated pelvic sidewall and prevescical extraperitoneal hematoma   Age indeterminate L1 compression deformity  Focal ground-glass opacities in the right upper lobe that is nonspecific possibly contusion versus atypical infectious process  Macrocytic anemia, folate  "deficiency    Relevant Medical History: HTN, HLD, CAD, Aflutter, VHD, dementia, COPD    Nutrition-Related Medications: folic acid, protonix  Calorie Containing IV Medications: no significant kcals from medications at this time    Nutrition-Related Labs: : RBC-2.93, H/H-9.5/29.1, TIBC-147, folate-5.9, AST-51       Nutrition Orders:   Diet heart healthy      Appetite/Oral Intake: poor/25-50% of meals    Factors Affecting Nutritional Intake: decreased appetite    Food/Zoroastrianism/Cultural Preferences: none reported    Food Allergies: no known food allergies    Wound(s):       Last Bowel Movement: 23    Comments    : granddaughter reports improving appetite, eating ~50% of meals. Appetite prior admission was poor. Agreed to ONS BID, stating pt drinks Ensure at home. Denies GI complaints. Denies unintentional weight loss prior admission. Pt with mild muscle wasting per NFPA.    Anthropometrics    Height: 5' 5" (165.1 cm)    Last Weight: 74 kg (163 lb 2.3 oz) (23 07) Weight Method: Bed Scale  BMI (Calculated): 27.1  BMI Classification: overweight (BMI 25-29.9)     Ideal Body Weight (IBW), Female: 125 lb     % Ideal Body Weight, Female (lb): 130.51 %                             Usual Weight Provided By: family/caregiver denies unintentional weight loss    Wt Readings from Last 5 Encounters:   23 74 kg (163 lb 2.3 oz)     Weight Change(s) Since Admission:   Wt Readings from Last 1 Encounters:   23 07 74 kg (163 lb 2.3 oz)   Admit Weight: 74 kg (163 lb 2.3 oz) (23 07), Weight Method: Bed Scale    Estimated Needs    Weight Used For Calorie Calculations: 74 kg (163 lb 2.3 oz)  Energy Calorie Requirements (kcal): 1435 kcal (1.2 stress factor)  Energy Need Method: [facility]  Weight Used For Protein Calculations: 74 kg (163 lb 2.3 oz)  Protein Requirements: 74-89 g (1.0-1.2 g/kg)  Fluid Requirements (mL): 1435 ml (1 ml/kcal)  Temp (24hrs), Av.7 °F (36.5 °C), Min:97.5 °F (36.4 " °C), Max:98.1 °F (36.7 °C)       Enteral Nutrition    Patient not receiving enteral nutrition at this time.    Parenteral Nutrition    Patient not receiving parenteral nutrition support at this time.    Evaluation of Received Nutrient Intake    Calories: not meeting estimated needs  Protein: not meeting estimated needs    Patient Education    Not applicable.    Nutrition Diagnosis     PES: Malnutrition related to acute illness as evidenced by mild muscle wasting and <50% energy intake for >1 month. (new)    Interventions/Goals     Intervention(s): general/healthful diet, commercial beverage, multivitamin/mineral supplement therapy, and collaboration with other providers    Goal: Meet greater than 80% of nutritional needs by follow-up. (new)    Monitoring & Evaluation     Dietitian will monitor food and beverage intake and weight.    Nutrition Risk/Follow-Up: moderate (follow-up in 3-5 days)   Please consult if re-assessment needed sooner.

## 2023-12-07 NOTE — PLAN OF CARE
Problem: Adult Inpatient Plan of Care  Goal: Plan of Care Review  12/7/2023 0139 by Mracelle Wallis RN  Outcome: Ongoing, Progressing  12/7/2023 0138 by Marcelle Wallis RN  Outcome: Ongoing, Progressing  Goal: Patient-Specific Goal (Individualized)  12/7/2023 0139 by Marcelle Wallis RN  Outcome: Ongoing, Progressing  12/7/2023 0138 by Marcelle Wallis RN  Outcome: Ongoing, Progressing  Goal: Absence of Hospital-Acquired Illness or Injury  12/7/2023 0139 by Marcelle Wallis RN  Outcome: Ongoing, Progressing  12/7/2023 0138 by Marcelle Wallis RN  Outcome: Ongoing, Progressing  Goal: Optimal Comfort and Wellbeing  12/7/2023 0139 by Marcelle Wallis RN  Outcome: Ongoing, Progressing  12/7/2023 0138 by Marcelle Wallis RN  Outcome: Ongoing, Progressing  Goal: Readiness for Transition of Care  12/7/2023 0139 by Marcelle Wallis RN  Outcome: Ongoing, Progressing  12/7/2023 0138 by Marcelle Wallis RN  Outcome: Ongoing, Progressing     Problem: Skin Injury Risk Increased  Goal: Skin Health and Integrity  12/7/2023 0139 by Marcelle Wallis RN  Outcome: Ongoing, Progressing  12/7/2023 0138 by Marcelle Wallis RN  Outcome: Ongoing, Progressing     Problem: Fluid and Electrolyte Imbalance (Acute Kidney Injury/Impairment)  Goal: Fluid and Electrolyte Balance  12/7/2023 0139 by Marcelle Wallis RN  Outcome: Ongoing, Progressing  12/7/2023 0138 by Marcelle Wallis RN  Outcome: Ongoing, Progressing     Problem: Oral Intake Inadequate (Acute Kidney Injury/Impairment)  Goal: Optimal Nutrition Intake  12/7/2023 0139 by Marcelle Wallis RN  Outcome: Ongoing, Progressing  12/7/2023 0138 by Marcelle Wallis RN  Outcome: Ongoing, Progressing     Problem: Renal Function Impairment (Acute Kidney Injury/Impairment)  Goal: Effective Renal Function  12/7/2023 0139 by Marcelle Wallis RN  Outcome: Ongoing, Progressing  12/7/2023 0138 by Bimal, Marcelle, RN  Outcome: Ongoing, Progressing     Problem: Pain Acute  Goal:  Acceptable Pain Control and Functional Ability  Outcome: Ongoing, Progressing     Problem: Fall Injury Risk  Goal: Absence of Fall and Fall-Related Injury  Outcome: Ongoing, Progressing

## 2023-12-07 NOTE — CONSULTS
[facility] - [facility] Neuro  Neurosurgery  Consult Note    Inpatient consult to Neurosurgery  Consult performed by: Jami Gibson, AGACNP-BC  Consult ordered by: Andrew Mi MD        Subjective:     Chief Complaint/Reason for Admission:  Neurosurgery consulted for age indeterminate compression deformity of L1.    History of Present Illness: 83 y.o. female with a past medical history of essential hypertension, hyperlipidemia, CAD, atrial flutter, valvular heart disease, and dementia presented to [facility] on 12/1/2023 for altered mental status.  Family reported altered mental status began 2 days ago with a fall from her chair.  Patient was [place] [facility] and diagnosed with a pelvic fracture and UTI.  Patient was discharged home with antibiotics and Norco.  Daughter reported worsening confusion and disorientation, prompting them to [facility] ED. initial vital signs upon arrival to ED were /58, pulse 119, respirations 20, temperature 36.7° C, and SpO2 96% on room air.  Labs revealed WBC 10.44, RBC 2.94, hemoglobin 9.7, hematocrit 29.1, MCV 99, CO2 22, BUN 24.8, creatinine 1.45, glucose 135, and lactic acid 1.2.  UA revealed trace occult blood, 25 leukocytes, 11-20 red blood cells, and 6-10 white blood cells.  Blood cultures were obtained.  CT head without contrast revealed no acute intracranial traumatic abnormality.  CT cervical spine revealed no acute cervical spine fracture, dislocation, or subluxation seen with degenerative changes.  CT chest, abdomen, and pelvis with IV contrast revealed comminuted fractures of the left pubic rami with associated pelvic sidewall and prevesical extraperitoneal hematoma, severe likely chronic age indeterminate compression deformity of L1, severe calcific burden of the coronary arteries with widespread mild-to-moderate aortoiliac and branch vessel atherosclerotic changes, and chronic bilateral lung parenchymal changes with focal ground-glass density at the posterior  right upper lobe that is nonspecific.  Chest x-ray revealed mild congestive process.  Patient was given LR and IV Rocephin 2 g in ED.  Orthopedics was consulted.  Patient was admitted to trauma services.  Orthopedics recommended nonoperative treatment with weight-bearing as tolerated.  PT and OT were consulted.  Hospital Medicine was consulted for transition of care and further medical management on 12/3.  Hospital stay was complicated with delirium resulting in confusion for which Seroquel was added         On physical exam patient is sitting up in the chair.  Granddaughter at bedside.  Patient continues to be pleasantly confused but follows commands and answers some questions appropriately.  She does not complain of back pain and actually has just ambulated with physical therapy with walker.  Granddaughter states that they believe the L1 compression fracture is older and patient has falls frequently.  Most recent fall within the last 2 weeks.      PTA Medications   Medication Sig    acetaminophen (TYLENOL) 500 MG tablet Take 650 mg by mouth Daily.    apixaban (ELIQUIS) 5 mg Tab Take 5 mg by mouth 2 (two) times daily.    budesonide 1 mg/2 mL NbSp Inhale 0.5 mg into the lungs as needed (As needed once daily). Controller    digoxin (LANOXIN) 125 mcg tablet Take 125 mcg by mouth once daily.    fluticasone-umeclidin-vilanter (TRELEGY ELLIPTA) 100-62.5-25 mcg DsDv Inhale 1 puff into the lungs daily as needed (As needed).    levalbuterol (XOPENEX) 1.25 mg/3 mL nebulizer solution Take 1 ampule by nebulization every 6 (six) hours as needed for Wheezing. Rescue    metoprolol succinate (TOPROL-XL) 25 MG 24 hr tablet Take 25 mg by mouth 2 (two) times a day.    pantoprazole (PROTONIX) 40 MG tablet Take 40 mg by mouth daily as needed (Daily prn).    potassium chloride SA (KLOR-CON M15) 15 MEQ tablet Take 20 mEq by mouth 2 (two) times daily.    simvastatin (ZOCOR) 20 MG tablet Take 20 mg by mouth Daily.    torsemide (DEMADEX) 20  MG Tab Take 20 mg by mouth once daily.       Review of patient's allergies indicates:  No Known Allergies    No past medical history on file.  No past surgical history on file.  Family History    None       Tobacco Use    Smoking status: Not on file    Smokeless tobacco: Not on file   Substance and Sexual Activity    Alcohol use: Not on file    Drug use: Not on file    Sexual activity: Not on file     Review of Systems   Constitutional:  Positive for activity change.   Psychiatric/Behavioral:  Positive for confusion.      Objective:     Weight: 74 kg (163 lb 2.3 oz)  Body mass index is 27.15 kg/m².  Vital Signs (Most Recent):  Temp: 97.7 °F (36.5 °C) (12/07/23 1107)  Pulse: 66 (12/07/23 1107)  Resp: 18 (12/07/23 0836)  BP: 104/65 (12/07/23 1107)  SpO2: (!) 94 % (12/07/23 0829) Vital Signs (24h Range):  Temp:  [97.5 °F (36.4 °C)-98.1 °F (36.7 °C)] 97.7 °F (36.5 °C)  Pulse:  [66-91] 66  Resp:  [18] 18  SpO2:  [87 %-97 %] 94 %  BP: ()/(47-68) 104/65                              Physical Exam:  Nursing note and vitals reviewed.    Constitutional: She appears well-developed and well-nourished. She is not diaphoretic. No distress.     Eyes: Pupils are equal, round, and reactive to light. Conjunctivae and EOM are normal.     Cardiovascular: Normal rate.     Abdominal: Soft. Bowel sounds are normal.     Skin: Skin displays no rash on trunk. Skin displays no lesions on trunk.     Psych/Behavior: She is alert.     Musculoskeletal:        Right Upper Extremities: Muscle strength is 4/5.        Left Upper Extremities: Muscle strength is 4/5.       Right Lower Extremities: Muscle strength is 4/5.        Left Lower Extremities: Muscle strength is 4/5.     Neurological:        Sensory: There is no sensory deficit in the trunk. There is no sensory deficit in the extremities.        DTRs: DTRs are DTRS NORMAL AND SYMMETRICnormal and symmetric. She displays no Babinski's sign on the right side. She displays no Babinski's sign  "on the left side.        Cranial nerves: Cranial nerve(s) II, III, IV, V, VI, VII, VIII, IX, X, XI and XII are intact.   Alert and pleasant answers some questions appropriate to situation.    PERRLA brisk   Moving all extremities lower extremities pain limited due to pelvic fractures.   Upper extremities 4/5, lower extremities 4/5 pain limited due to pelvic fractures.  No sensory deficits.    Patient has ambulated with walker in [place]   Minimal back pain.         Significant Labs:  Recent Labs   Lab 12/06/23 0443      K 3.5   CO2 25   BUN 10.2   CREATININE 0.64   CALCIUM 8.4   MG 2.20     Recent Labs   Lab 12/06/23 0443   WBC 7.97   HGB 9.5*   HCT 29.1*        No results for input(s): "LABPT", "INR", "APTT" in the last 48 hours.  Microbiology Results (last 7 days)       Procedure Component Value Units Date/Time    Blood culture #1 **CANNOT BE ORDERED STAT** [[id number]]  (Normal) Collected: 12/01/23 2217    Order Status: Completed Specimen: Blood Updated: 12/06/23 2300     CULTURE, BLOOD ([facility]) No Growth at 5 days    Blood culture #2 **CANNOT BE ORDERED STAT** [[id number]]  (Normal) Collected: 12/01/23 2217    Order Status: Completed Specimen: Blood Updated: 12/06/23 2300     CULTURE, BLOOD ([facility]) No Growth at 5 days            Assessment/Plan:    Age-indeterminate L1 compression deformity   Comminuted fracture of left pubic rami with associated pelvic sidewall and prevesical extraperitoneal hematoma  History of dementia, previous falls.        No acute neurosurgical interventions   Patient ambulating with physical therapy with walker.    Continue mobilization PTOT.  Fall precautions  SCDs          Active Diagnoses:    Diagnosis Date Noted POA    PRINCIPAL PROBLEM:  Closed displaced fracture of pelvis [S32.9XXA] 12/02/2023 Yes    SABINO (acute kidney injury) [N17.9] 12/03/2023 Yes    UTI (urinary tract infection) [N39.0] 12/03/2023 Yes      Problems Resolved During this Admission:       Thank you for your " consult. I will follow-up with patient. Please contact us if you have any additional questions.    ARJUN Ma-BC  Neurosurgery  [facility] - Orth[facility] Neuro

## 2023-12-07 NOTE — PLAN OF CARE
LOCET called in and faxed, awaiting 142. Sent referral to pt's first choice [facility] via [facility]. Pt's second choice is [facility] Swing Bed.

## 2023-12-07 NOTE — PT/OT/SLP PROGRESS
Physical Therapy Treatment    Patient Name:  Pepper Gardner   MRN:  [id number]    Recommendations:     Discharge therapy intensity: Moderate Intensity Therapy   Discharge Equipment Recommendations: to be determined by next level of care  Barriers to discharge: Decreased caregiver support and Impaired mobility    Assessment:     Pepper Gardner is a 83 y.o. female admitted with a medical diagnosis of Closed displaced fracture of pelvis.  She presents with the following impairments/functional limitations: weakness, impaired endurance, impaired functional mobility, gait instability.    Rehab Prognosis: Good;  patient would benefit from acute skilled PT services to address these deficits and reach maximum level of function.    Recent Surgery: * No surgery found *      Plan:     During this hospitalization, patient to be seen 6 x/week to address the identified rehab impairments via gait training, therapeutic activities, therapeutic exercises, neuromuscular re-education and progress toward the following goals:    Plan of Care Expires:  01/03/24    Subjective     Chief Complaint: weakness    Objective:     Communicated with nurse prior to session.  Patient found left sidelying with   upon PT entry to room.     General Precautions: Standard,    Orthopedic Precautions: LLE weight bearing as tolerated, RLE weight bearing as tolerated  Braces:    Respiratory Status: Nasal cannula, flow 2 L/min  Blood Pressure:   Skin Integrity: Visible skin intact      Functional Mobility:  Mod assist rolling and max assist to stand from bed. Gait 4 steps x 2 trials with right knee buckle with stance. Pt performed LE PRE's to increase strenth, ROM, and endurance to improve overall independence. Pt is willing but she is just weak. Improved cognition today. Patient left up in chair with all lines intact, call button in reach, and chair alarm on.    Pt sat in chair >2.5 hrs and assisted BTB with max assist x 2.     GOALS:   Multidisciplinary  Problems       Physical Therapy Goals          Problem: Physical Therapy    Goal Priority Disciplines Outcome Goal Variances Interventions   Physical Therapy Goal     PT, PT/OT Ongoing, Progressing     Description: Goals to be met by: 1/3/24     Patient will increase functional independence with mobility by performing:    Sit to supine with Modified [place]  Sit to stand transfer with Modified [place]  Tolerate gait assessment.                         Time Tracking:       Billable Minutes: Gait Training 10 and Therapeutic Exercise 14    Treatment Type: Treatment  PT/PTA: PTA     Number of PTA visits since last PT visit: 4     12/07/2023

## 2023-12-07 NOTE — PROGRESS NOTES
[facility]  Hospital Medicine Progress Note        Chief Complaint: Inpatient Follow-up for fracture    HPI:   83 y.o. female with a past medical history of essential hypertension, hyperlipidemia, CAD, atrial flutter, valvular heart disease, and dementia presented to [facility] on 12/1/2023 for altered mental status.  Family reported altered mental status began 2 days ago with a fall from her chair.  Patient was seen at [facility] and diagnosed with a pelvic fracture and UTI.  Patient was discharged home with antibiotics and Norco.  Daughter reported worsening confusion and disorientation, prompting them to [facility] ED. initial vital signs upon arrival to ED were /58, pulse 119, respirations 20, temperature 36.7° C, and SpO2 96% on room air.  Labs revealed WBC 10.44, RBC 2.94, hemoglobin 9.7, hematocrit 29.1, MCV 99, CO2 22, BUN 24.8, creatinine 1.45, glucose 135, and lactic acid 1.2.  UA revealed trace occult blood, 25 leukocytes, 11-20 red blood cells, and 6-10 white blood cells.  Blood cultures were obtained.  CT head without contrast revealed no acute intracranial traumatic abnormality.  CT cervical spine revealed no acute cervical spine fracture, dislocation, or subluxation seen with degenerative changes.  CT chest, abdomen, and pelvis with IV contrast revealed comminuted fractures of the left pubic rami with associated pelvic sidewall and prevesical extraperitoneal hematoma, severe likely chronic age indeterminate compression deformity of L1, severe calcific burden of the coronary arteries with widespread mild-to-moderate aortoiliac and branch vessel atherosclerotic changes, and chronic bilateral lung parenchymal changes with focal ground-glass density at the posterior right upper lobe that is nonspecific.  Chest x-ray revealed mild congestive process.  Patient was given LR and IV Rocephin 2 g in ED.  Orthopedics was consulted.  Patient was admitted to trauma services.  Orthopedics  recommended nonoperative treatment with weight-bearing as tolerated.  PT and OT were consulted.  Hospital Medicine was consulted for transition of care and further medical management on 12/3.  Hospital stay was complicated with delirium resulting in confusion for which Seroquel was added      Interval Hx:     HD S.  More alert, comfortable this morning.  No family members were seen at bedside.  He is tolerating p.o., moving bowels.  Denies any excessive pain.  No team labs were not obtained for this morning.    Objective/physical exam:  Vitals:    12/05/23 0721 12/05/23 0851 12/05/23 0852 12/05/23 1120   BP: 113/76   106/70   Pulse: 91 96 96 89   Resp: 18 18 18 18   Temp: 97.6 °F (36.4 °C)   98.1 °F (36.7 °C)   TempSrc: Oral   Oral   SpO2: (!) 90%   (!) 92%     General: In no acute distress, afebrile  Respiratory: Clear to auscultation bilaterally  Cardiovascular: S1, S2, no appreciable murmur  Abdomen: Soft, nontender, BS +  MSK: Warm, no lower extremity edema, no clubbing or cyanosis  Neurologic: Alert and oriented x4, moving all extremities         Lab Results   Component Value Date     12/05/2023    K 4.1 12/05/2023    CO2 22 (L) 12/05/2023    BUN 12.8 12/05/2023    CREATININE 0.71 12/05/2023    CALCIUM 8.4 12/05/2023      Lab Results   Component Value Date    ALT 20 12/04/2023    AST 39 (H) 12/04/2023    ALKPHOS 99 12/04/2023    BILITOT 1.1 12/04/2023      Lab Results   Component Value Date    WBC 8.85 12/05/2023    HGB 9.7 (L) 12/05/2023    HCT 30.2 (L) 12/05/2023    .7 (H) 12/05/2023     12/05/2023           Medications:   acetaminophen  650 mg Oral Q6H    cefTRIAXone (ROCEPHIN) IVPB  1 g Intravenous Q24H    digoxin  125 mcg Oral Daily    enoxparin  40 mg Subcutaneous Q12H (prophylaxis, 0900/2100)    LIDOcaine  1 patch Transdermal Q24H    methocarbamoL  500 mg Oral Daily    metoprolol succinate  12.5 mg Oral BID    pantoprazole  40 mg Oral Daily    umeclidinium  1 capsule Inhalation Daily       acetaminophen, budesonide, diphenhydrAMINE, fluticasone furoate-vilanteroL **AND** umeclidinium, levalbuterol, LIDOcaine (PF) 20 mg/mL (2%), melatonin, ondansetron, oxyCODONE, oxyCODONE     Assessment/Plan:    Hospital-acquired delirium on dementia  Comminuted fracture of the left pubic rami with associated pelvic sidewall and prevescical extraperitoneal hematoma   Age indeterminate L1 compression deformity   Focal ground-glass opacities in the right upper lobe that is nonspecific possibly contusion versus atypical infectious process  Macrocytic anemia, folate deficiency    HX:  AFib, HTN, HLD, VHD, dementia, O2 dependent COPD, osteopenia, diverticulosis    Plan:  Continue Seroquel for few nights to help with delirium.  Continue soft restraints if necessary  -we will repeat labs tomorrow and consider resuming Eliquis if HGB stable  -continue digoxin and metoprolol at current doses.  Monitor for hypotension.  Continue telemetry.  Eliquis on hold  -consulted neurosurgery for further input regarding L1 compression fracture  -continue oxygen supplementation.  Continue bronchodilator therapies  -other home medications were reviewed and renewed  . keep folate  -needs NH placement     Severiano Mi MD

## 2023-12-07 NOTE — PLAN OF CARE
Spoke to family today spouse in agreement with [facility] as first choice for SNF,  second choice is [facility] swing bed.  FOC obtained.  Homer sent referral

## 2023-12-07 NOTE — PT/OT/SLP PROGRESS
"1023 pt [facility] , just finished PT session, OT to return   OT attempted 1322 pt was already back to bed, declined OT treatment " I just got back to bed. I can't. I hurt. My back hurts"  " Emergency Dept/Urgent Care discharge antibiotic (if prescribed): Amoxicillin 500 mg PO capsule, 1 capsule (500 mg) by mouth every 8 hours for 14 days  Date of Rx (if applicable):  10/22/19  No changes in treatment per Urine culture protocol.

## 2023-12-08 LAB
ALBUMIN SERPL-MCNC: 2.5 G/DL (ref 3.4–4.8)
ALBUMIN/GLOB SERPL: 0.8 RATIO (ref 1.1–2)
ALP SERPL-CCNC: 140 UNIT/L (ref 40–150)
ALT SERPL-CCNC: 32 UNIT/L (ref 0–55)
AST SERPL-CCNC: 44 UNIT/L (ref 5–34)
BASOPHILS # BLD AUTO: 0.08 X10(3)/MCL
BASOPHILS NFR BLD AUTO: 1.1 %
BILIRUB SERPL-MCNC: 1.4 MG/DL
BUN SERPL-MCNC: 8.5 MG/DL (ref 9.8–20.1)
CALCIUM SERPL-MCNC: 8.3 MG/DL (ref 8.4–10.2)
CHLORIDE SERPL-SCNC: 103 MMOL/L (ref 98–107)
CO2 SERPL-SCNC: 25 MMOL/L (ref 23–31)
CREAT SERPL-MCNC: 0.66 MG/DL (ref 0.55–1.02)
EOSINOPHIL # BLD AUTO: 0.04 X10(3)/MCL (ref 0–0.9)
EOSINOPHIL NFR BLD AUTO: 0.5 %
ERYTHROCYTE [DISTWIDTH] IN BLOOD BY AUTOMATED COUNT: 23.1 % (ref 11.5–17)
GFR SERPLBLD CREATININE-BSD FMLA CKD-EPI: >60 MLS/MIN/1.73/M2
GLOBULIN SER-MCNC: 3.1 GM/DL (ref 2.4–3.5)
GLUCOSE SERPL-MCNC: 113 MG/DL (ref 82–115)
HCT VFR BLD AUTO: 28.1 % (ref 37–47)
HGB BLD-MCNC: 9.2 G/DL (ref 12–16)
IMM GRANULOCYTES # BLD AUTO: 0.27 X10(3)/MCL (ref 0–0.04)
IMM GRANULOCYTES NFR BLD AUTO: 3.6 %
LYMPHOCYTES # BLD AUTO: 1.27 X10(3)/MCL (ref 0.6–4.6)
LYMPHOCYTES NFR BLD AUTO: 17 %
MAGNESIUM SERPL-MCNC: 2.1 MG/DL (ref 1.6–2.6)
MCH RBC QN AUTO: 32.5 PG (ref 27–31)
MCHC RBC AUTO-ENTMCNC: 32.7 G/DL (ref 33–36)
MCV RBC AUTO: 99.3 FL (ref 80–94)
MONOCYTES # BLD AUTO: 0.7 X10(3)/MCL (ref 0.1–1.3)
MONOCYTES NFR BLD AUTO: 9.3 %
NEUTROPHILS # BLD AUTO: 5.13 X10(3)/MCL (ref 2.1–9.2)
NEUTROPHILS NFR BLD AUTO: 68.5 %
NRBC BLD AUTO-RTO: 1.3 %
PLATELET # BLD AUTO: 253 X10(3)/MCL (ref 130–400)
PMV BLD AUTO: 11.5 FL (ref 7.4–10.4)
POTASSIUM SERPL-SCNC: 3.8 MMOL/L (ref 3.5–5.1)
PROT SERPL-MCNC: 5.6 GM/DL (ref 5.8–7.6)
RBC # BLD AUTO: 2.83 X10(6)/MCL (ref 4.2–5.4)
SODIUM SERPL-SCNC: 134 MMOL/L (ref 136–145)
WBC # SPEC AUTO: 7.49 X10(3)/MCL (ref 4.5–11.5)

## 2023-12-08 PROCEDURE — 21400001 HC TELEMETRY ROOM

## 2023-12-08 PROCEDURE — 25000003 PHARM REV CODE 250: Performed by: INTERNAL MEDICINE

## 2023-12-08 PROCEDURE — 97164 PT RE-EVAL EST PLAN CARE: CPT

## 2023-12-08 PROCEDURE — 25000003 PHARM REV CODE 250: Performed by: NURSE PRACTITIONER

## 2023-12-08 PROCEDURE — 97530 THERAPEUTIC ACTIVITIES: CPT | Mod: CO

## 2023-12-08 PROCEDURE — 97110 THERAPEUTIC EXERCISES: CPT | Mod: CQ

## 2023-12-08 PROCEDURE — 25000242 PHARM REV CODE 250 ALT 637 W/ HCPCS: Performed by: NURSE PRACTITIONER

## 2023-12-08 PROCEDURE — 97530 THERAPEUTIC ACTIVITIES: CPT | Mod: CQ

## 2023-12-08 PROCEDURE — 83735 ASSAY OF MAGNESIUM: CPT | Performed by: INTERNAL MEDICINE

## 2023-12-08 PROCEDURE — 85025 COMPLETE CBC W/AUTO DIFF WBC: CPT | Performed by: INTERNAL MEDICINE

## 2023-12-08 PROCEDURE — 80053 COMPREHEN METABOLIC PANEL: CPT | Performed by: INTERNAL MEDICINE

## 2023-12-08 RX ORDER — QUETIAPINE FUMARATE 25 MG/1
25 TABLET, FILM COATED ORAL NIGHTLY PRN
Status: DISCONTINUED | OUTPATIENT
Start: 2023-12-08 | End: 2023-12-12 | Stop reason: HOSPADM

## 2023-12-08 RX ADMIN — APIXABAN 5 MG: 5 TABLET, FILM COATED ORAL at 09:12

## 2023-12-08 RX ADMIN — UMECLIDINIUM 62.5 MCG: 62.5 AEROSOL, POWDER ORAL at 09:12

## 2023-12-08 RX ADMIN — METHOCARBAMOL 500 MG: 500 TABLET ORAL at 09:12

## 2023-12-08 RX ADMIN — METOPROLOL SUCCINATE 12.5 MG: 25 TABLET, EXTENDED RELEASE ORAL at 09:12

## 2023-12-08 RX ADMIN — ACETAMINOPHEN 650 MG: 325 TABLET, FILM COATED ORAL at 05:12

## 2023-12-08 RX ADMIN — FOLIC ACID 1 MG: 1 TABLET ORAL at 09:12

## 2023-12-08 RX ADMIN — DIGOXIN 125 MCG: 125 TABLET ORAL at 09:12

## 2023-12-08 RX ADMIN — PANTOPRAZOLE SODIUM 40 MG: 40 TABLET, DELAYED RELEASE ORAL at 09:12

## 2023-12-08 RX ADMIN — LIDOCAINE 5% 1 PATCH: 700 PATCH TOPICAL at 06:12

## 2023-12-08 RX ADMIN — ACETAMINOPHEN 650 MG: 325 TABLET, FILM COATED ORAL at 11:12

## 2023-12-08 RX ADMIN — ACETAMINOPHEN 650 MG: 325 TABLET, FILM COATED ORAL at 06:12

## 2023-12-08 NOTE — PLAN OF CARE
Problem: Adult Inpatient Plan of Care  Goal: Plan of Care Review  Outcome: Ongoing, Progressing  Goal: Patient-Specific Goal (Individualized)  Outcome: Ongoing, Progressing  Goal: Absence of Hospital-Acquired Illness or Injury  Outcome: Ongoing, Progressing  Goal: Optimal Comfort and Wellbeing  Outcome: Ongoing, Progressing  Goal: Readiness for Transition of Care  Outcome: Ongoing, Progressing     Problem: Skin Injury Risk Increased  Goal: Skin Health and Integrity  Outcome: Ongoing, Progressing     Problem: Fluid and Electrolyte Imbalance (Acute Kidney Injury/Impairment)  Goal: Fluid and Electrolyte Balance  Outcome: Ongoing, Progressing     Problem: Oral Intake Inadequate (Acute Kidney Injury/Impairment)  Goal: Optimal Nutrition Intake  Outcome: Ongoing, Progressing     Problem: Renal Function Impairment (Acute Kidney Injury/Impairment)  Goal: Effective Renal Function  Outcome: Ongoing, Progressing     Problem: Pain Acute  Goal: Acceptable Pain Control and Functional Ability  Outcome: Ongoing, Progressing     Problem: Fall Injury Risk  Goal: Absence of Fall and Fall-Related Injury  Outcome: Ongoing, Progressing

## 2023-12-08 NOTE — PT/OT/SLP RE-EVAL
Physical Therapy Re-Evaluation    Patient Name:  Pepper Gardner   MRN:  [id number]    Recommendations:     Discharge therapy intensity: Moderate Intensity Therapy   Discharge Equipment Recommendations: bedside commode, hip kit   Barriers to discharge: Impaired mobility    Assessment:     Pepper Gardner is a 83 y.o. female admitted with a medical diagnosis of Closed displaced fracture of pelvis.  She presents with the following impairments/functional limitations: weakness, gait instability, impaired balance, impaired endurance, decreased safety awareness, impaired functional mobility. The pt tolerated re-eval well.Continue to progress as able.     Rehab Prognosis: Fair; patient would benefit from acute skilled PT services to address these deficits and reach maximum level of function.    Recent Surgery: * No surgery found *      Plan:     During this hospitalization, patient to be seen 6 x/week to address the identified rehab impairments via gait training, therapeutic activities, therapeutic exercises and progress toward the following goals:    Plan of Care Expires:  01/08/24    Subjective     Chief Complaint: none  Patient/Family Comments/goals: return to PLOF  Pain/Comfort:       Patients cultural, spiritual, Zoroastrian conflicts given the current situation: no    Objective:     Communicated with NSG prior to session.  Patient found seated in chair with oxygen, PureWick  upon PT entry to room.    General Precautions: Standard, fall  Orthopedic Precautions:LLE weight bearing as tolerated, RLE weight bearing as tolerated   Braces: N/A  Blood Pressure: NA      Exams:  RLE ROM: limited 2/2 weakness  RLE Strength: grossly >3-/5  LLE ROM: limited 2/2 weakness  LLE Strength: grossly >3-/5  Skin integrity: Visible skin intact    Patient provided with verbal education education regarding PT role/goals/POC.  Understanding was verbalized.     Patient left up in chair with all lines intact.    GOALS:   Multidisciplinary Problems        Physical Therapy Goals          Problem: Physical Therapy    Goal Priority Disciplines Outcome Goal Variances Interventions   Physical Therapy Goal     PT, PT/OT Ongoing, Progressing     Description: Goals to be met by: 1/3/24     Patient will increase functional independence with mobility by performing:    Sit to supine with Modified [place]  Sit to stand transfer with Modified [place]  Tolerate gait assessment.                         History:     No past medical history on file.    No past surgical history on file.    Time Tracking:     PT Received On: 12/08/23  PT Start Time: 0955     PT Stop Time: 1004  PT Total Time (min): 9 min     Billable Minutes: Re-eval 9      12/08/2023

## 2023-12-08 NOTE — PROGRESS NOTES
"[facility]  Neurosurgery  Progress Note    Subjective:     Interval History: NAEs. Patient pleasantly confused. Ambulated with therapy yesterday.     Post-Op Info:  * No surgery found *          Medications:  Continuous Infusions:  Scheduled Meds:   acetaminophen  650 mg Oral Q6H    digoxin  125 mcg Oral Daily    enoxparin  40 mg Subcutaneous Q12H (prophylaxis, 0900/2100)    folic acid  1 mg Oral Daily    LIDOcaine  1 patch Transdermal Q24H    methocarbamoL  500 mg Oral Daily    metoprolol succinate  12.5 mg Oral BID    pantoprazole  40 mg Oral Daily    QUEtiapine  25 mg Oral QHS    umeclidinium  1 capsule Inhalation Daily     PRN Meds:acetaminophen, budesonide, diphenhydrAMINE, fluticasone furoate-vilanteroL **AND** umeclidinium, levalbuterol, LIDOcaine (PF) 20 mg/mL (2%), melatonin, ondansetron, oxyCODONE, oxyCODONE       Objective:     Weight: 74 kg (163 lb 2.3 oz)  Body mass index is 27.15 kg/m².  Vital Signs (Most Recent):  Temp: 97.5 °F (36.4 °C) (12/08/23 0350)  Pulse: 72 (12/08/23 0350)  Resp: 16 (12/08/23 0350)  BP: (!) 98/56 (12/08/23 0350)  SpO2: 98 % (12/08/23 0350) Vital Signs (24h Range):  Temp:  [97 °F (36.1 °C)-98 °F (36.7 °C)] 97.5 °F (36.4 °C)  Pulse:  [66-90] 72  Resp:  [16-18] 16  SpO2:  [94 %-98 %] 98 %  BP: ()/(56-74) 98/56                              Neurosurgery Physical Exam  Awake, alert.   Moves lower extremities well.   CN2-12 intact. EOMI. Speech clear.     Significant Labs:  Recent Labs   Lab 12/08/23  0434   *   K 3.8   CO2 25   BUN 8.5*   CREATININE 0.66   CALCIUM 8.3*   MG 2.10     Recent Labs   Lab 12/08/23  0434   WBC 7.49   HGB 9.2*   HCT 28.1*        No results for input(s): "LABPT", "INR", "APTT" in the last 48 hours.  Microbiology Results (last 7 days)       Procedure Component Value Units Date/Time    Blood culture #1 **CANNOT BE ORDERED STAT** [[id number]]  (Normal) Collected: 12/01/23 2217    Order Status: Completed Specimen: " Blood Updated: 12/06/23 2300     CULTURE, BLOOD ([facility]) No Growth at 5 days    Blood culture #2 **CANNOT BE ORDERED STAT** [[id number]]  (Normal) Collected: 12/01/23 2217    Order Status: Completed Specimen: Blood Updated: 12/06/23 2300     CULTURE, BLOOD ([facility]) No Growth at 5 days              Assessment/Plan:     Active Diagnoses:    Diagnosis Date Noted POA    PRINCIPAL PROBLEM:  Closed displaced fracture of pelvis [S32.9XXA] 12/02/2023 Yes    Moderate malnutrition [E44.0] 12/07/2023 Yes    SABINO (acute kidney injury) [N17.9] 12/03/2023 Yes    UTI (urinary tract infection) [N39.0] 12/03/2023 Yes      Problems Resolved During this Admission:     -No neurosurgical intervention warranted.   -PTOT  -Fall precautions  -SCDs.   -Patient pending placement   -We will sign off. Call with any questions.     ANDREY Reddy  Neurosurgery  [facility] - [facility]

## 2023-12-08 NOTE — PROGRESS NOTES
[facility]  Hospital Medicine Progress Note        Chief Complaint: Inpatient Follow-up for fracture    HPI:   83 y.o. female with a past medical history of essential hypertension, hyperlipidemia, CAD, atrial flutter, valvular heart disease, and dementia presented to [facility] on 12/1/2023 for altered mental status.  Family reported altered mental status began 2 days ago with a fall from her chair.  [facility] and diagnosed with a pelvic fracture and UTI.  Patient was discharged home with antibiotics and Norco.  Daughter reported worsening confusion and disorientation, prompting them to [facility] ED. initial vital signs upon arrival to ED were /58, pulse 119, respirations 20, temperature 36.7° C, and SpO2 96% on room air.  Labs revealed WBC 10.44, RBC 2.94, hemoglobin 9.7, hematocrit 29.1, MCV 99, CO2 22, BUN 24.8, creatinine 1.45, glucose 135, and lactic acid 1.2.  UA revealed trace occult blood, 25 leukocytes, 11-20 red blood cells, and 6-10 white blood cells.  Blood cultures were obtained.  CT head without contrast revealed no acute intracranial traumatic abnormality.  CT cervical spine revealed no acute cervical spine fracture, dislocation, or subluxation seen with degenerative changes.  CT chest, abdomen, and pelvis with IV contrast revealed comminuted fractures of the left pubic rami with associated pelvic sidewall and prevesical extraperitoneal hematoma, severe likely chronic age indeterminate compression deformity of L1, severe calcific burden of the coronary arteries with widespread mild-to-moderate aortoiliac and branch vessel atherosclerotic changes, and chronic bilateral lung parenchymal changes with focal ground-glass density at the posterior right upper lobe that is nonspecific.  Chest x-ray revealed mild congestive process.  Patient was given LR and IV Rocephin 2 g in ED.  Orthopedics was consulted.  Patient was admitted to trauma services.  Orthopedics  recommended nonoperative treatment with weight-bearing as tolerated.  PT and OT were consulted.  Hospital Medicine was consulted for transition of care and further medical management on 12/3.  Hospital stay was complicated with delirium resulting in confusion for which Seroquel was added.  Neurosurgery was consulted for vertebral compression deformity and they recommended PT, conservative management.      Interval Hx:       No acute events overnight.  Hemodynamically stable.  More alert, tolerating physical therapy.  Also few questions appropriately but remains disoriented to time place.  No family members were seen at bedside.  She is currently awaiting nursing home placement.    Labs showing mild hyponatremia, stable hemoglobin for past few days.    Objective/physical exam:  Vitals:    12/05/23 0721 12/05/23 0851 12/05/23 0852 12/05/23 1120   BP: 113/76   106/70   Pulse: 91 96 96 89   Resp: 18 18 18 18   Temp: 97.6 °F (36.4 °C)   98.1 °F (36.7 °C)   TempSrc: Oral   Oral   SpO2: (!) 90%   (!) 92%     General: In no acute distress, afebrile  Respiratory: Clear to auscultation bilaterally  Cardiovascular: S1, S2, no appreciable murmur  Abdomen: Soft, nontender, BS +  MSK: Warm, no lower extremity edema, no clubbing or cyanosis  Neurologic: Alert and oriented x4, moving all extremities         Lab Results   Component Value Date     12/05/2023    K 4.1 12/05/2023    CO2 22 (L) 12/05/2023    BUN 12.8 12/05/2023    CREATININE 0.71 12/05/2023    CALCIUM 8.4 12/05/2023      Lab Results   Component Value Date    ALT 20 12/04/2023    AST 39 (H) 12/04/2023    ALKPHOS 99 12/04/2023    BILITOT 1.1 12/04/2023      Lab Results   Component Value Date    WBC 8.85 12/05/2023    HGB 9.7 (L) 12/05/2023    HCT 30.2 (L) 12/05/2023    .7 (H) 12/05/2023     12/05/2023           Medications:   acetaminophen  650 mg Oral Q6H    cefTRIAXone (ROCEPHIN) IVPB  1 g Intravenous Q24H    digoxin  125 mcg Oral Daily    enoxparin   40 mg Subcutaneous Q12H (prophylaxis, 0900/2100)    LIDOcaine  1 patch Transdermal Q24H    methocarbamoL  500 mg Oral Daily    metoprolol succinate  12.5 mg Oral BID    pantoprazole  40 mg Oral Daily    umeclidinium  1 capsule Inhalation Daily      acetaminophen, budesonide, diphenhydrAMINE, fluticasone furoate-vilanteroL **AND** umeclidinium, levalbuterol, LIDOcaine (PF) 20 mg/mL (2%), melatonin, ondansetron, oxyCODONE, oxyCODONE     Assessment/Plan:    Hospital-acquired delirium on dementia  Comminuted fracture of the left pubic rami with associated pelvic sidewall and prevescical extraperitoneal hematoma   Age indeterminate L1 compression deformity   Focal ground-glass opacities in the right upper lobe that is nonspecific possibly contusion versus atypical infectious process  Macrocytic anemia, folate deficiency    HX:  AFib on Eliquis, HTN, HLD, VHD, dementia, O2 dependent COPD, osteopenia, diverticulosis    Plan:  -resume Eliquis.  Monitor hemoglobin  -continue metoprolol, digoxin at current doses.  Monitor for low blood pressures.  Continue telemetry  -neurosurgery evaluation noted, recommended continuation of therapy, pain control  -will keep Seroquel as needed for delirium  -therapy as tolerated.  Needs nursing home placement  -home meds were reviewed.  Added folate    Andrew Mi MD

## 2023-12-08 NOTE — PT/OT/SLP PROGRESS
Physical Therapy Treatment    Patient Name:  Pepper Gardner   MRN:  [id number]    Recommendations:     Discharge therapy intensity: Moderate Intensity Therapy   Discharge Equipment Recommendations: to be determined by next level of care  Barriers to discharge: Decreased caregiver support and Impaired mobility    Assessment:     Pepper Gardner is a 83 y.o. female admitted with a medical diagnosis of Closed displaced fracture of pelvis.  She presents with the following impairments/functional limitations: weakness, impaired endurance, impaired functional mobility, gait instability.    Rehab Prognosis: Fair; patient would benefit from acute skilled PT services to address these deficits and reach maximum level of function.    Recent Surgery: * No surgery found *      Plan:     During this hospitalization, patient to be seen 6 x/week to address the identified rehab impairments via gait training, therapeutic activities, therapeutic exercises, neuromuscular re-education and progress toward the following goals:    Plan of Care Expires:  01/03/24    Subjective     Chief Complaint: pain with mobility    Objective:     Communicated with nurse prior to session.  Patient found supine with   upon PT entry to room.     General Precautions: Standard,    Orthopedic Precautions: LLE weight bearing as tolerated, RLE weight bearing as tolerated  Braces:    Respiratory Status: Nasal cannula, flow 2 L/min  Blood Pressure:   Skin Integrity: Dry    Functional Mobility:  Max assist to get EOB and max assist to stand. Unable to step today due to weakness and pt finished with LE PRE's to increase strenth, ROM, and endurance to improve overall independence. Patient left up in chair with all lines intact, call button in reach, and chair alarm on.    Education Provided:  Role and goals of PT, transfer training, bed mobility, gait training, balance training, safety awareness, assistive device, strengthening exercises, and importance of participating  in PT to return to PLOF.        GOALS:   Multidisciplinary Problems       Physical Therapy Goals          Problem: Physical Therapy    Goal Priority Disciplines Outcome Goal Variances Interventions   Physical Therapy Goal     PT, PT/OT Ongoing, Progressing     Description: Goals to be met by: 1/3/24     Patient will increase functional independence with mobility by performing:    Sit to supine with Modified [place]  Sit to stand transfer with Modified [place]  Tolerate gait assessment.                         Time Tracking:     Billable Minutes: Therapeutic Activity 15 and Therapeutic Exercise 10    Treatment Type: Treatment  PT/PTA: PTA     Number of PTA visits since last PT visit: 5     12/08/2023

## 2023-12-08 NOTE — PT/OT/SLP PROGRESS
Occupational Therapy   Treatment    Name: Pepper Gardner  MRN: [id number]  Admitting Diagnosis:  Closed displaced fracture of pelvis       Recommendations:     Recommended therapy intensity at discharge: Moderate Intensity Therapy   Discharge Equipment Recommendations:  bedside commode, hip kit  Barriers to discharge:       Assessment:     Pepper Gardner is a 83 y.o. female with a medical diagnosis of Closed displaced fracture of pelvis. Performance deficits affecting function are weakness, impaired endurance, impaired self care skills, impaired cognition, impaired functional mobility, gait instability, impaired balance, decreased safety awareness, decreased lower extremity function, pain. Tolerated sitting UIC x2 hours.     Rehab Prognosis:  Good; patient would benefit from acute skilled OT services to address these deficits and reach maximum level of function.       Plan:     Patient to be seen 5 x/week to address the above listed problems via self-care/home management, therapeutic activities, therapeutic exercises  Plan of Care Expires: 01/03/24  Plan of Care Reviewed with: patient, family    Subjective     Pain/Comfort:  Location - Side 1: Bilateral  Location 1: hip  Pain Addressed 1: Reposition, Distraction    Objective:     Communicated with: RN prior to session.  Patient found up in chair with oxygen, telemetry, PureWick (chair alarm) upon OT entry to room.    General Precautions: Standard, fall    Orthopedic Precautions:LLE weight bearing as tolerated, RLE weight bearing as tolerated  Braces: N/A  Respiratory Status: Nasal cannula, flow 3 L/min     Occupational Performance:     Bed Mobility:    Patient completed Rolling/Turning to Left with  moderate assistance  Patient completed Rolling/Turning to Right with moderate assistance  Patient completed Sit to Supine with moderate assistance     Functional Mobility/Transfers:  Patient completed Sit <> Stand Transfer with moderate assistance and of 2 persons  with   rolling walker  x2 trials from chair.   Functional Mobility: performed squat pivot t/f from chair<>bed with Max A x2.     Activities of Daily Living:  LE dressing: Max A to doff brief in supine. Able to assist with rolling Mod A and use of bed rails.     Therapeutic Positioning    OT interventions performed during the course of today's session in an effort to prevent and/or reduce acquired pressure injuries:   Therapeutic positioning was provided at the conclusion of session to offload all bony prominences for the prevention and/or reduction of pressure injuries    Skin assessment: all bony prominences were assessed    Findings: no redness or breakdown noted    [facility] 6 Click ADL:      Patient Education:  Patient provided with verbal education education regarding OT role/goals/POC and safety awareness.  Additional teaching is warranted.      Patient left left sidelying with all lines intact, call button in reach, bed alarm on, and family present    GOALS:   Multidisciplinary Problems       Occupational Therapy Goals          Problem: Occupational Therapy    Goal Priority Disciplines Outcome Interventions   Occupational Therapy Goal     OT, PT/OT Ongoing, Progressing    Description: Goals to be met by: 1/3/23     Patient will increase functional independence with ADLs by performing:    UE Dressing with [place].  LE Dressing with Modified [place].  Grooming while seated at sink with [place].  Toileting from bedside commode with Minimal Assistance for hygiene and clothing management.   Stand pivot transfers with Minimal Assistance.  Squat pivot transfers with Supervision.  Toilet transfer to bedside commode with Supervision squat pivot.                         Time Tracking:     OT Date of Treatment: 12/08/23  OT Start Time: 1055  OT Stop Time: 1110  OT Total Time (min): 15 min    Billable Minutes:Therapeutic Activity 15    OT/CHRIS: CHRIS     Number of CHRIS visits since last OT visit: 3    12/8/2023

## 2023-12-09 LAB
AMMONIA PLAS-MSCNC: 13.8 UMOL/L (ref 18–72)
ANION GAP SERPL CALC-SCNC: 7 MEQ/L
APPEARANCE UR: CLEAR
BACTERIA #/AREA URNS AUTO: ABNORMAL /HPF
BASOPHILS # BLD AUTO: 0.09 X10(3)/MCL
BASOPHILS NFR BLD AUTO: 1.2 %
BILIRUB UR QL STRIP.AUTO: NEGATIVE
BUN SERPL-MCNC: 10.8 MG/DL (ref 9.8–20.1)
CALCIUM SERPL-MCNC: 8.6 MG/DL (ref 8.4–10.2)
CHLORIDE SERPL-SCNC: 103 MMOL/L (ref 98–107)
CO2 SERPL-SCNC: 27 MMOL/L (ref 23–31)
COLOR UR AUTO: YELLOW
CREAT SERPL-MCNC: 0.65 MG/DL (ref 0.55–1.02)
CREAT/UREA NIT SERPL: 17
EOSINOPHIL # BLD AUTO: 0.09 X10(3)/MCL (ref 0–0.9)
EOSINOPHIL NFR BLD AUTO: 1.2 %
ERYTHROCYTE [DISTWIDTH] IN BLOOD BY AUTOMATED COUNT: 23.4 % (ref 11.5–17)
GFR SERPLBLD CREATININE-BSD FMLA CKD-EPI: >60 MLS/MIN/1.73/M2
GLUCOSE SERPL-MCNC: 97 MG/DL (ref 82–115)
GLUCOSE UR QL STRIP.AUTO: NORMAL
HCT VFR BLD AUTO: 28.7 % (ref 37–47)
HGB BLD-MCNC: 9.4 G/DL (ref 12–16)
IMM GRANULOCYTES # BLD AUTO: 0.22 X10(3)/MCL (ref 0–0.04)
IMM GRANULOCYTES NFR BLD AUTO: 2.9 %
KETONES UR QL STRIP.AUTO: NEGATIVE
LEUKOCYTE ESTERASE UR QL STRIP.AUTO: NEGATIVE
LYMPHOCYTES # BLD AUTO: 1.45 X10(3)/MCL (ref 0.6–4.6)
LYMPHOCYTES NFR BLD AUTO: 18.8 %
MCH RBC QN AUTO: 32.5 PG (ref 27–31)
MCHC RBC AUTO-ENTMCNC: 32.8 G/DL (ref 33–36)
MCV RBC AUTO: 99.3 FL (ref 80–94)
MONOCYTES # BLD AUTO: 0.8 X10(3)/MCL (ref 0.1–1.3)
MONOCYTES NFR BLD AUTO: 10.4 %
MUCOUS THREADS URNS QL MICRO: ABNORMAL /LPF
NEUTROPHILS # BLD AUTO: 5.06 X10(3)/MCL (ref 2.1–9.2)
NEUTROPHILS NFR BLD AUTO: 65.5 %
NITRITE UR QL STRIP.AUTO: NEGATIVE
NRBC BLD AUTO-RTO: 1 %
PH UR STRIP.AUTO: 5.5 [PH]
PLATELET # BLD AUTO: 266 X10(3)/MCL (ref 130–400)
PMV BLD AUTO: 11.6 FL (ref 7.4–10.4)
POTASSIUM SERPL-SCNC: 3.9 MMOL/L (ref 3.5–5.1)
PROT UR QL STRIP.AUTO: NEGATIVE
RBC # BLD AUTO: 2.89 X10(6)/MCL (ref 4.2–5.4)
RBC #/AREA URNS AUTO: ABNORMAL /HPF
RBC UR QL AUTO: NEGATIVE
SODIUM SERPL-SCNC: 137 MMOL/L (ref 136–145)
SP GR UR STRIP.AUTO: 1.02 (ref 1–1.03)
SQUAMOUS #/AREA URNS LPF: ABNORMAL /HPF
UROBILINOGEN UR STRIP-ACNC: 2
WBC # SPEC AUTO: 7.71 X10(3)/MCL (ref 4.5–11.5)
WBC #/AREA URNS AUTO: ABNORMAL /HPF

## 2023-12-09 PROCEDURE — 25000003 PHARM REV CODE 250: Performed by: NURSE PRACTITIONER

## 2023-12-09 PROCEDURE — 25000003 PHARM REV CODE 250: Performed by: INTERNAL MEDICINE

## 2023-12-09 PROCEDURE — 80048 BASIC METABOLIC PNL TOTAL CA: CPT | Performed by: INTERNAL MEDICINE

## 2023-12-09 PROCEDURE — 25000003 PHARM REV CODE 250: Performed by: STUDENT IN AN ORGANIZED HEALTH CARE EDUCATION/TRAINING PROGRAM

## 2023-12-09 PROCEDURE — 97530 THERAPEUTIC ACTIVITIES: CPT | Mod: CQ

## 2023-12-09 PROCEDURE — 21400001 HC TELEMETRY ROOM

## 2023-12-09 PROCEDURE — 81001 URINALYSIS AUTO W/SCOPE: CPT | Performed by: INTERNAL MEDICINE

## 2023-12-09 PROCEDURE — 25000242 PHARM REV CODE 250 ALT 637 W/ HCPCS: Performed by: NURSE PRACTITIONER

## 2023-12-09 PROCEDURE — 82140 ASSAY OF AMMONIA: CPT | Performed by: INTERNAL MEDICINE

## 2023-12-09 PROCEDURE — 85025 COMPLETE CBC W/AUTO DIFF WBC: CPT | Performed by: INTERNAL MEDICINE

## 2023-12-09 RX ORDER — SODIUM CHLORIDE 9 MG/ML
INJECTION, SOLUTION INTRAVENOUS CONTINUOUS
Status: DISCONTINUED | OUTPATIENT
Start: 2023-12-09 | End: 2023-12-10

## 2023-12-09 RX ADMIN — ACETAMINOPHEN 650 MG: 325 TABLET, FILM COATED ORAL at 12:12

## 2023-12-09 RX ADMIN — SODIUM CHLORIDE: 9 INJECTION, SOLUTION INTRAVENOUS at 02:12

## 2023-12-09 RX ADMIN — ACETAMINOPHEN 650 MG: 325 TABLET, FILM COATED ORAL at 01:12

## 2023-12-09 RX ADMIN — METOPROLOL SUCCINATE 12.5 MG: 25 TABLET, EXTENDED RELEASE ORAL at 10:12

## 2023-12-09 RX ADMIN — PANTOPRAZOLE SODIUM 40 MG: 40 TABLET, DELAYED RELEASE ORAL at 10:12

## 2023-12-09 RX ADMIN — APIXABAN 5 MG: 5 TABLET, FILM COATED ORAL at 09:12

## 2023-12-09 RX ADMIN — LIDOCAINE 5% 1 PATCH: 700 PATCH TOPICAL at 10:12

## 2023-12-09 RX ADMIN — Medication 6 MG: at 09:12

## 2023-12-09 RX ADMIN — METHOCARBAMOL 500 MG: 500 TABLET ORAL at 10:12

## 2023-12-09 RX ADMIN — QUETIAPINE FUMARATE 25 MG: 25 TABLET ORAL at 09:12

## 2023-12-09 RX ADMIN — METOPROLOL SUCCINATE 12.5 MG: 25 TABLET, EXTENDED RELEASE ORAL at 09:12

## 2023-12-09 RX ADMIN — APIXABAN 5 MG: 5 TABLET, FILM COATED ORAL at 10:12

## 2023-12-09 RX ADMIN — DIGOXIN 125 MCG: 125 TABLET ORAL at 10:12

## 2023-12-09 RX ADMIN — UMECLIDINIUM 62.5 MCG: 62.5 AEROSOL, POWDER ORAL at 12:12

## 2023-12-09 RX ADMIN — FOLIC ACID 1 MG: 1 TABLET ORAL at 10:12

## 2023-12-09 NOTE — PROGRESS NOTES
[facility]  Hospital Medicine Progress Note        Chief Complaint: Inpatient Follow-up for fracture    HPI: 83 y.o. female with a past medical history of essential hypertension, hyperlipidemia, CAD, atrial flutter, valvular heart disease, and dementia presented to [facility] on 12/1/2023 for altered mental status.  Family reported altered mental status began 2 days ago with a fall from her chair.  Patient was seen at [facility] [place] and diagnosed with a pelvic fracture and UTI.  Patient was discharged home with antibiotics and Norco.  Daughter reported worsening confusion and disorientation, prompting them to [facility] ED. initial vital signs upon arrival to ED were /58, pulse 119, respirations 20, temperature 36.7° C, and SpO2 96% on room air.  Labs revealed WBC 10.44, RBC 2.94, hemoglobin 9.7, hematocrit 29.1, MCV 99, CO2 22, BUN 24.8, creatinine 1.45, glucose 135, and lactic acid 1.2.  UA revealed trace occult blood, 25 leukocytes, 11-20 red blood cells, and 6-10 white blood cells.  Blood cultures were obtained.  CT head without contrast revealed no acute intracranial traumatic abnormality.  CT cervical spine revealed no acute cervical spine fracture, dislocation, or subluxation seen with degenerative changes.  CT chest, abdomen, and pelvis with IV contrast revealed comminuted fractures of the left pubic rami with associated pelvic sidewall and prevesical extraperitoneal hematoma, severe likely chronic age indeterminate compression deformity of L1, severe calcific burden of the coronary arteries with widespread mild-to-moderate aortoiliac and branch vessel atherosclerotic changes, and chronic bilateral lung parenchymal changes with focal ground-glass density at the posterior right upper lobe that is nonspecific.  Chest x-ray revealed mild congestive process.  Patient was given LR and IV Rocephin 2 g in ED.  Orthopedics was consulted.  Patient was admitted to trauma services.  Orthopedics  recommended nonoperative treatment with weight-bearing as tolerated.  PT and OT were consulted.  Hospital Medicine was consulted for transition of care and further medical management on 12/3.  Hospital stay was complicated with delirium resulting in confusion for which Seroquel was added.  Neurosurgery was consulted for vertebral compression deformity and they recommended PT, conservative management.     Interval Hx:   Patient was seen and examined at bedside , family at bedside, quite disoriented.Complains dysuria.     Chart was reviewed, tmax 99 , most recent labs reviewed. No labs from today          Objective/physical exam:  General: In no acute distress, afebrile  Respiratory: Clear to auscultation bilaterally  Cardiovascular: S1, S2, no appreciable murmur  Abdomen: Soft, nontender, BS +  MSK: Warm, no lower extremity edema, no clubbing or cyanosis  Neurologic: Alert and oriented x4, moving all extremities        VITAL SIGNS: 24 HRS MIN & MAX LAST   Temp  Min: 97.6 °F (36.4 °C)  Max: 99 °F (37.2 °C) 98 °F (36.7 °C)   BP  Min: 97/58  Max: 145/80 106/68   Pulse  Min: 55  Max: 80  68   Resp  Min: 18  Max: 18 18   SpO2  Min: 95 %  Max: 98 % 97 %     I have reviewed the following labs:  Recent Labs   Lab 12/06/23 0443 12/08/23  0434 12/09/23  0600   WBC 7.97 7.49 7.71   RBC 2.93* 2.83* 2.89*   HGB 9.5* 9.2* 9.4*   HCT 29.1* 28.1* 28.7*   MCV 99.3* 99.3* 99.3*   MCH 32.4* 32.5* 32.5*   MCHC 32.6* 32.7* 32.8*   RDW 22.6* 23.1* 23.4*    253 266   MPV 11.8* 11.5* 11.6*     Recent Labs   Lab 12/04/23  0534 12/05/23  0535 12/06/23 0443 12/08/23  0434   * 138 136 134*   K 4.1 4.1 3.5 3.8   CO2 21* 22* 25 25   BUN 15.6 12.8 10.2 8.5*   CREATININE 0.83 0.71 0.64 0.66   CALCIUM 8.6 8.4 8.4 8.3*   MG 2.20  --  2.20 2.10   ALBUMIN 2.4*  --  2.4* 2.5*   ALKPHOS 99  --  112 140   ALT 20  --  32 32   AST 39*  --  51* 44*   BILITOT 1.1  --  1.0 1.4     Microbiology Results (last 7 days)       Procedure Component  Value Units Date/Time    Blood culture #1 **CANNOT BE ORDERED STAT** [[id number]]  (Normal) Collected: 12/01/23 2217    Order Status: Completed Specimen: Blood Updated: 12/06/23 2300     CULTURE, BLOOD ([facility]) No Growth at 5 days    Blood culture #2 **CANNOT BE ORDERED STAT** [[id number]]  (Normal) Collected: 12/01/23 2217    Order Status: Completed Specimen: Blood Updated: 12/06/23 2300     CULTURE, BLOOD ([facility]) No Growth at 5 days             See below for Radiology    Scheduled Med:   acetaminophen  650 mg Oral Q6H    apixaban  5 mg Oral BID    digoxin  125 mcg Oral Daily    folic acid  1 mg Oral Daily    LIDOcaine  1 patch Transdermal Q24H    methocarbamoL  500 mg Oral Daily    metoprolol succinate  12.5 mg Oral BID    pantoprazole  40 mg Oral Daily    umeclidinium  1 capsule Inhalation Daily      Continuous Infusions:   sodium chloride 0.9%        PRN Meds:  acetaminophen, budesonide, diphenhydrAMINE, fluticasone furoate-vilanteroL **AND** umeclidinium, levalbuterol, LIDOcaine (PF) 20 mg/mL (2%), melatonin, ondansetron, oxyCODONE, oxyCODONE, QUEtiapine     Assessment/Plan:  Hospital-acquired delirium on dementia  Macrocytic anemia, folate deficiency  Focal ground-glass opacities in the right upper lobe that is nonspecific possibly contusion versus atypical infectious process  Comminuted fracture of the left pubic rami with associated pelvic sidewall and prevescical extraperitoneal hematoma   Age indeterminate L1 compression deformity  HX:  AFib on Eliquis, HTN, HLD, VHD, dementia, O2 dependent COPD, osteopenia, diverticulosis     Plan:  -Seroquel as needed for delirium. Check UA. On gentle hydration. Check blood gas due to hx of COPD. Check Ammonia  -Resumed Eliquis.  Monitor hemoglobin. Added folate  -Neurosurgery evaluation noted, recommended continuation of therapy, pain control  -Continue Metoprolol, digoxin at current doses.  Monitor for low blood pressures.  Continue telemetry  -Therapy as tolerated.  Needs  nursing home placement  -Home meds were reviewed.  -Cont supportive care, Fall precautions    VTE prophylaxis: eliquis      Anticipated discharge and Disposition:     tbd    All diagnosis and differential diagnosis have been reviewed; assessment and plan has been documented; I have personally reviewed the labs and test results that are presently available; I have reviewed the patients medication list; I have reviewed the consulting providers response and recommendations. I have reviewed or attempted to review medical records based upon their availability    All of the patient's questions have been  addressed and answered. Patient's is agreeable to the above stated plan. I will continue to monitor closely and make adjustments to medical management as needed.  _____________________________________________________________________    Nutrition Status:    Radiology:  I have personally reviewed the following imaging and agree with the radiologist.     Echo    Left Ventricle: The left ventricle is normal in size. Normal wall   thickness. Normal wall motion. There is normal systolic function with a   visually estimated ejection fraction of 55 - 60%. There is diastolic   dysfunction.    Right Ventricle: Mild right ventricular enlargement. Systolic function   is reduced.TAPSE is 1.52 cm.    Right Atrium: Right atrium is mildly dilated.    Aortic Valve: Moderately calcified cusps. Moderately restricted motion.   There is moderate to severe stenosis. Aortic valve area by VTI is 0.89   cm². Aortic valve peak velocity is 3.9 m/s. Mean gradient is 30 mmHg. The   dimensionless index is 0.31. There is mild aortic regurgitation.    Mitral Valve: There is mild mitral annular calcification present. There   is mild regurgitation.    Tricuspid Valve: The tricuspid valve is structurally normal. There is   moderate regurgitation.    Pulmonary Artery: The estimated pulmonary artery systolic pressure is   66 mmHg.    IVC/SVC: Intermediate  venous pressure at 8 mmHg.      Bridget Pimentel MD   12/09/2023

## 2023-12-09 NOTE — PT/OT/SLP PROGRESS
Physical Therapy Treatment    Patient Name:  Pepper Gardner   MRN:  [id number]    Recommendations:     Discharge therapy intensity: Moderate Intensity Therapy   Discharge Equipment Recommendations: bedside commode, hip kit  Barriers to discharge: Decreased caregiver support and Impaired mobility    Assessment:     Pepper Gardner is a 83 y.o. female admitted with a medical diagnosis of Closed displaced fracture of pelvis.  She presents with the following impairments/functional limitations: weakness, gait instability, impaired balance, impaired endurance, decreased safety awareness, impaired functional mobility.    Rehab Prognosis: Good; patient would benefit from acute skilled PT services to address these deficits and reach maximum level of function.    Recent Surgery: * No surgery found *      Plan:     During this hospitalization, patient to be seen 6 x/week to address the identified rehab impairments via gait training, therapeutic activities, therapeutic exercises and progress toward the following goals:    Plan of Care Expires:  01/08/24    Subjective     Chief Complaint: Pain    Objective:     Communicated with nurse prior to session.  Patient found supine with   upon PT entry to room.     General Precautions: Standard, fall  Orthopedic Precautions: LLE weight bearing as tolerated, RLE weight bearing as tolerated  Braces: N/A  Respiratory Status: Nasal cannula, flow 2 L/min  Blood Pressure:   Skin Integrity: Visible skin intact      Functional Mobility:  Pt rolled left and right to brina diaper but during the process she said she was too tired and in too much pain to continue. I spoke with her son re functional mobility.     Education Provided:  Role and goals of PT, transfer training, bed mobility, gait training, balance training, safety awareness, assistive device, strengthening exercises, and importance of participating in PT to return to PLOF.    Patient left right sidelying with all lines intact and call  button in reach..    GOALS:   Multidisciplinary Problems       Physical Therapy Goals          Problem: Physical Therapy    Goal Priority Disciplines Outcome Goal Variances Interventions   Physical Therapy Goal     PT, PT/OT Ongoing, Progressing     Description: Goals to be met by: 1/3/24     Patient will increase functional independence with mobility by performing:    Sit to supine with Modified [place]  Sit to stand transfer with Modified [place]  Tolerate gait assessment.                         Time Tracking:     Billable Minutes: Therapeutic Activity 15    Treatment Type: Treatment  PT/PTA: PTA     Number of PTA visits since last PT visit: 1     12/09/2023

## 2023-12-10 LAB
ALLENS TEST BLOOD GAS (OHS): YES
BASE EXCESS BLD CALC-SCNC: 4 MMOL/L
BASOPHILS # BLD AUTO: 0.1 X10(3)/MCL
BASOPHILS NFR BLD AUTO: 1.2 %
BLOOD GAS SAMPLE TYPE (OHS): ABNORMAL
CA-I BLD-SCNC: 1.19 MMOL/L (ref 1.12–1.23)
CO2 BLDA-SCNC: 28.7 MMOL/L
DRAWN BY BLOOD GAS (OHS): ABNORMAL
EOSINOPHIL # BLD AUTO: 0.09 X10(3)/MCL (ref 0–0.9)
EOSINOPHIL NFR BLD AUTO: 1 %
ERYTHROCYTE [DISTWIDTH] IN BLOOD BY AUTOMATED COUNT: 23.9 % (ref 11.5–17)
HCO3 BLDA-SCNC: 27.6 MMOL/L (ref 22–26)
HCT VFR BLD AUTO: 31.5 % (ref 37–47)
HGB BLD-MCNC: 10 G/DL (ref 12–16)
IMM GRANULOCYTES # BLD AUTO: 0.21 X10(3)/MCL (ref 0–0.04)
IMM GRANULOCYTES NFR BLD AUTO: 2.4 %
LPM (OHS): 3
LYMPHOCYTES # BLD AUTO: 1.24 X10(3)/MCL (ref 0.6–4.6)
LYMPHOCYTES NFR BLD AUTO: 14.3 %
MCH RBC QN AUTO: 31.8 PG (ref 27–31)
MCHC RBC AUTO-ENTMCNC: 31.7 G/DL (ref 33–36)
MCV RBC AUTO: 100.3 FL (ref 80–94)
MONOCYTES # BLD AUTO: 0.81 X10(3)/MCL (ref 0.1–1.3)
MONOCYTES NFR BLD AUTO: 9.4 %
NEUTROPHILS # BLD AUTO: 6.2 X10(3)/MCL (ref 2.1–9.2)
NEUTROPHILS NFR BLD AUTO: 71.7 %
NRBC BLD AUTO-RTO: 0.6 %
OXYGEN DEVICE BLOOD GAS (OHS): ABNORMAL
PCO2 BLDA: 37 MMHG (ref 35–45)
PH BLDA: 7.48 [PH] (ref 7.35–7.45)
PLATELET # BLD AUTO: 271 X10(3)/MCL (ref 130–400)
PMV BLD AUTO: 11.3 FL (ref 7.4–10.4)
PO2 BLDA: 84 MMHG (ref 80–100)
POTASSIUM BLOOD GAS (OHS): 3.9 MMOL/L (ref 3.5–5)
RBC # BLD AUTO: 3.14 X10(6)/MCL (ref 4.2–5.4)
SAMPLE SITE BLOOD GAS (OHS): ABNORMAL
SAO2 % BLDA: 97 %
SODIUM BLOOD GAS (OHS): 133 MMOL/L (ref 137–145)
WBC # SPEC AUTO: 8.65 X10(3)/MCL (ref 4.5–11.5)

## 2023-12-10 PROCEDURE — 25000003 PHARM REV CODE 250: Performed by: NURSE PRACTITIONER

## 2023-12-10 PROCEDURE — 21400001 HC TELEMETRY ROOM

## 2023-12-10 PROCEDURE — 25000003 PHARM REV CODE 250: Performed by: INTERNAL MEDICINE

## 2023-12-10 PROCEDURE — 25000003 PHARM REV CODE 250: Performed by: STUDENT IN AN ORGANIZED HEALTH CARE EDUCATION/TRAINING PROGRAM

## 2023-12-10 PROCEDURE — 63600175 PHARM REV CODE 636 W HCPCS: Performed by: NURSE PRACTITIONER

## 2023-12-10 PROCEDURE — 25000242 PHARM REV CODE 250 ALT 637 W/ HCPCS: Performed by: NURSE PRACTITIONER

## 2023-12-10 PROCEDURE — 85025 COMPLETE CBC W/AUTO DIFF WBC: CPT | Performed by: INTERNAL MEDICINE

## 2023-12-10 RX ADMIN — PANTOPRAZOLE SODIUM 40 MG: 40 TABLET, DELAYED RELEASE ORAL at 08:12

## 2023-12-10 RX ADMIN — ACETAMINOPHEN 650 MG: 325 TABLET, FILM COATED ORAL at 05:12

## 2023-12-10 RX ADMIN — APIXABAN 5 MG: 5 TABLET, FILM COATED ORAL at 08:12

## 2023-12-10 RX ADMIN — UMECLIDINIUM 62.5 MCG: 62.5 AEROSOL, POWDER ORAL at 08:12

## 2023-12-10 RX ADMIN — DIGOXIN 125 MCG: 125 TABLET ORAL at 08:12

## 2023-12-10 RX ADMIN — METHOCARBAMOL 500 MG: 500 TABLET ORAL at 08:12

## 2023-12-10 RX ADMIN — Medication 6 MG: at 08:12

## 2023-12-10 RX ADMIN — FOLIC ACID 1 MG: 1 TABLET ORAL at 08:12

## 2023-12-10 RX ADMIN — ACETAMINOPHEN 650 MG: 325 TABLET, FILM COATED ORAL at 08:12

## 2023-12-10 RX ADMIN — LIDOCAINE 5% 1 PATCH: 700 PATCH TOPICAL at 05:12

## 2023-12-10 RX ADMIN — METOPROLOL SUCCINATE 12.5 MG: 25 TABLET, EXTENDED RELEASE ORAL at 08:12

## 2023-12-10 RX ADMIN — SODIUM CHLORIDE: 9 INJECTION, SOLUTION INTRAVENOUS at 02:12

## 2023-12-10 RX ADMIN — DIPHENHYDRAMINE HYDROCHLORIDE 12.5 MG: 50 INJECTION INTRAMUSCULAR; INTRAVENOUS at 08:12

## 2023-12-10 RX ADMIN — ACETAMINOPHEN 650 MG: 325 TABLET, FILM COATED ORAL at 12:12

## 2023-12-10 NOTE — PROGRESS NOTES
[facility]  Hospital Medicine Progress Note        Chief Complaint: Inpatient Follow-up for fracture    HPI: 83 y.o. female with a past medical history of essential hypertension, hyperlipidemia, CAD, atrial flutter, valvular heart disease, and dementia presented to [facility] on 12/1/2023 for altered mental status.  Family reported altered mental status began 2 days ago with a fall from her chair.  Patient was seen at [facility] [place] and diagnosed with a pelvic fracture and UTI.  Patient was discharged home with antibiotics and Norco.  Daughter reported worsening confusion and disorientation, prompting them to [facility] ED. initial vital signs upon arrival to ED were /58, pulse 119, respirations 20, temperature 36.7° C, and SpO2 96% on room air.  Labs revealed WBC 10.44, RBC 2.94, hemoglobin 9.7, hematocrit 29.1, MCV 99, CO2 22, BUN 24.8, creatinine 1.45, glucose 135, and lactic acid 1.2.  UA revealed trace occult blood, 25 leukocytes, 11-20 red blood cells, and 6-10 white blood cells.  Blood cultures were obtained.  CT head without contrast revealed no acute intracranial traumatic abnormality.  CT cervical spine revealed no acute cervical spine fracture, dislocation, or subluxation seen with degenerative changes.  CT chest, abdomen, and pelvis with IV contrast revealed comminuted fractures of the left pubic rami with associated pelvic sidewall and prevesical extraperitoneal hematoma, severe likely chronic age indeterminate compression deformity of L1, severe calcific burden of the coronary arteries with widespread mild-to-moderate aortoiliac and branch vessel atherosclerotic changes, and chronic bilateral lung parenchymal changes with focal ground-glass density at the posterior right upper lobe that is nonspecific.  Chest x-ray revealed mild congestive process.  Patient was given LR and IV Rocephin 2 g in ED.  Orthopedics was consulted.  Patient was admitted to trauma services.  Orthopedics  recommended nonoperative treatment with weight-bearing as tolerated.  PT and OT were consulted.  Hospital Medicine was consulted for transition of care and further medical management on 12/3.  Hospital stay was complicated with delirium resulting in confusion for which Seroquel was added.  Neurosurgery was consulted for vertebral compression deformity and they recommended PT, conservative management.     Interval Hx:   UA does not suggest any infection, afebrile, no leukocytosis, no hypercapnea, ammonia not elevated    Patient was seen and examined at bedside , comfortably sleeping, family at bedside, says patient continues to have disorientation, improving however per them.      Objective/physical exam:  General: In no acute distress, afebrile  Respiratory: Clear to auscultation bilaterally  Cardiovascular: S1, S2, no appreciable murmur  Abdomen: Soft, nontender, BS +  MSK: Warm, no lower extremity edema, no clubbing or cyanosis  Neurologic: Alert and orientedx1-2, moving all extremities        VITAL SIGNS: 24 HRS MIN & MAX LAST   Temp  Min: 97.4 °F (36.3 °C)  Max: 98.1 °F (36.7 °C) 97.5 °F (36.4 °C)   BP  Min: 91/56  Max: 118/76 (!) 105/58   Pulse  Min: 66  Max: 70  67   Resp  Min: 18  Max: 18 18   SpO2  Min: 89 %  Max: 100 % 100 %     I have reviewed the following labs:  Recent Labs   Lab 12/08/23  0434 12/09/23  0600 12/10/23  0625   WBC 7.49 7.71 8.65   RBC 2.83* 2.89* 3.14*   HGB 9.2* 9.4* 10.0*   HCT 28.1* 28.7* 31.5*   MCV 99.3* 99.3* 100.3*   MCH 32.5* 32.5* 31.8*   MCHC 32.7* 32.8* 31.7*   RDW 23.1* 23.4* 23.9*    266 271   MPV 11.5* 11.6* 11.3*     Recent Labs   Lab 12/04/23  0534 12/05/23  0535 12/06/23  0443 12/08/23  0434 12/09/23  1514 12/10/23  0134   *   < > 136 134* 137  --    K 4.1   < > 3.5 3.8 3.9  --    CO2 21*   < > 25 25 27  --    BUN 15.6   < > 10.2 8.5* 10.8  --    CREATININE 0.83   < > 0.64 0.66 0.65  --    CALCIUM 8.6   < > 8.4 8.3* 8.6  --    PH  --   --   --   --   --   7.480*   MG 2.20  --  2.20 2.10  --   --    ALBUMIN 2.4*  --  2.4* 2.5*  --   --    ALKPHOS 99  --  112 140  --   --    ALT 20  --  32 32  --   --    AST 39*  --  51* 44*  --   --    BILITOT 1.1  --  1.0 1.4  --   --     < > = values in this interval not displayed.     Microbiology Results (last 7 days)       Procedure Component Value Units Date/Time    Blood culture #1 **CANNOT BE ORDERED STAT** [[id number]]  (Normal) Collected: 12/01/23 2217    Order Status: Completed Specimen: Blood Updated: 12/06/23 2300     CULTURE, BLOOD ([facility]) No Growth at 5 days    Blood culture #2 **CANNOT BE ORDERED STAT** [[id number]]  (Normal) Collected: 12/01/23 2217    Order Status: Completed Specimen: Blood Updated: 12/06/23 2300     CULTURE, BLOOD ([facility]) No Growth at 5 days             See below for Radiology    Scheduled Med:   acetaminophen  650 mg Oral Q6H    apixaban  5 mg Oral BID    digoxin  125 mcg Oral Daily    folic acid  1 mg Oral Daily    LIDOcaine  1 patch Transdermal Q24H    methocarbamoL  500 mg Oral Daily    metoprolol succinate  12.5 mg Oral BID    pantoprazole  40 mg Oral Daily    umeclidinium  1 capsule Inhalation Daily      Continuous Infusions:   sodium chloride 0.9% 50 mL/hr at 12/10/23 0717      PRN Meds:  acetaminophen, budesonide, diphenhydrAMINE, fluticasone furoate-vilanteroL **AND** umeclidinium, levalbuterol, LIDOcaine (PF) 20 mg/mL (2%), melatonin, ondansetron, oxyCODONE, oxyCODONE, QUEtiapine     Assessment/Plan:  Hospital-acquired delirium on dementia  Macrocytic anemia, folate deficiency  Focal ground-glass opacities in the right upper lobe that is nonspecific possibly contusion versus atypical infectious process  Comminuted fracture of the left pubic rami with associated pelvic sidewall and prevescical extraperitoneal hematoma   Age indeterminate L1 compression deformity  Moderate malnutrition    HX:  AFib on Eliquis, HTN, HLD, VHD, dementia, O2 dependent COPD, osteopenia, diverticulosis      Plan:  -Seroquel as needed for delirium.Improving.  Completed rocephin per pharmacy.Encouraged redirection .  Blood cultures negative, repeat UA negative for any infection, CT reports reviewed, findings as above under assessment.  Patient without any fevers or leukocytosis.  No reports of diarrhea.  Ammonia level not elevated.  No hypercapnia.  Monitor fever curve.    -Resumed Eliquis.  Monitor hemoglobin. Added folate  -Neurosurgery evaluation noted, recommended continuation of therapy, pain control  -Home meds were reviewed.Continue Metoprolol, digoxin at current doses.  Monitor for low blood pressures.  Continue telemetry  -Therapy as tolerated.  Needs nursing home placement  -Cont other supportive care, Fall precautions, Decub precautions, wean o2    VTE prophylaxis: eliquis      Anticipated discharge and Disposition:     Placement, wean o2    All diagnosis and differential diagnosis have been reviewed; assessment and plan has been documented; I have personally reviewed the labs and test results that are presently available; I have reviewed the patients medication list; I have reviewed the consulting providers response and recommendations. I have reviewed or attempted to review medical records based upon their availability    All of the patient's questions have been  addressed and answered. Patient's is agreeable to the above stated plan. I will continue to monitor closely and make adjustments to medical management as needed.  _____________________________________________________________________    Nutrition Status:    Radiology:  I have personally reviewed the following imaging and agree with the radiologist.     Echo    Left Ventricle: The left ventricle is normal in size. Normal wall   thickness. Normal wall motion. There is normal systolic function with a   visually estimated ejection fraction of 55 - 60%. There is diastolic   dysfunction.    Right Ventricle: Mild right ventricular enlargement. Systolic  function   is reduced.TAPSE is 1.52 cm.    Right Atrium: Right atrium is mildly dilated.    Aortic Valve: Moderately calcified cusps. Moderately restricted motion.   There is moderate to severe stenosis. Aortic valve area by VTI is 0.89   cm². Aortic valve peak velocity is 3.9 m/s. Mean gradient is 30 mmHg. The   dimensionless index is 0.31. There is mild aortic regurgitation.    Mitral Valve: There is mild mitral annular calcification present. There   is mild regurgitation.    Tricuspid Valve: The tricuspid valve is structurally normal. There is   moderate regurgitation.    Pulmonary Artery: The estimated pulmonary artery systolic pressure is   66 mmHg.    IVC/SVC: Intermediate venous pressure at 8 mmHg.      Bridget Pimentel MD   12/10/2023

## 2023-12-10 NOTE — PT/OT/SLP PROGRESS
Occupational Therapy      Patient Name:  ePpper Gardner   MRN:  [id number]    Patient not seen today secondary to increased lethargy and sleeping on GUERIN's arrival. Per nurse, Pt had Seroquel last night.   . Will follow-up and treat as Pt is appropriate.    12/10/2023

## 2023-12-11 PROCEDURE — 25000003 PHARM REV CODE 250: Performed by: NURSE PRACTITIONER

## 2023-12-11 PROCEDURE — 25000242 PHARM REV CODE 250 ALT 637 W/ HCPCS: Performed by: NURSE PRACTITIONER

## 2023-12-11 PROCEDURE — 21400001 HC TELEMETRY ROOM

## 2023-12-11 PROCEDURE — 97530 THERAPEUTIC ACTIVITIES: CPT | Mod: CQ

## 2023-12-11 PROCEDURE — 97530 THERAPEUTIC ACTIVITIES: CPT | Mod: CO

## 2023-12-11 PROCEDURE — 25000003 PHARM REV CODE 250: Performed by: INTERNAL MEDICINE

## 2023-12-11 PROCEDURE — 25000003 PHARM REV CODE 250: Performed by: STUDENT IN AN ORGANIZED HEALTH CARE EDUCATION/TRAINING PROGRAM

## 2023-12-11 PROCEDURE — 97110 THERAPEUTIC EXERCISES: CPT | Mod: CQ

## 2023-12-11 RX ADMIN — APIXABAN 5 MG: 5 TABLET, FILM COATED ORAL at 08:12

## 2023-12-11 RX ADMIN — APIXABAN 5 MG: 5 TABLET, FILM COATED ORAL at 09:12

## 2023-12-11 RX ADMIN — PANTOPRAZOLE SODIUM 40 MG: 40 TABLET, DELAYED RELEASE ORAL at 08:12

## 2023-12-11 RX ADMIN — Medication 6 MG: at 09:12

## 2023-12-11 RX ADMIN — FOLIC ACID 1 MG: 1 TABLET ORAL at 08:12

## 2023-12-11 RX ADMIN — LIDOCAINE 5% 1 PATCH: 700 PATCH TOPICAL at 06:12

## 2023-12-11 RX ADMIN — QUETIAPINE FUMARATE 25 MG: 25 TABLET ORAL at 09:12

## 2023-12-11 RX ADMIN — ACETAMINOPHEN 650 MG: 325 TABLET, FILM COATED ORAL at 05:12

## 2023-12-11 RX ADMIN — ACETAMINOPHEN 650 MG: 325 TABLET, FILM COATED ORAL at 06:12

## 2023-12-11 RX ADMIN — UMECLIDINIUM 62.5 MCG: 62.5 AEROSOL, POWDER ORAL at 08:12

## 2023-12-11 RX ADMIN — DIGOXIN 125 MCG: 125 TABLET ORAL at 08:12

## 2023-12-11 RX ADMIN — ACETAMINOPHEN 650 MG: 325 TABLET, FILM COATED ORAL at 12:12

## 2023-12-11 RX ADMIN — METOPROLOL SUCCINATE 12.5 MG: 25 TABLET, EXTENDED RELEASE ORAL at 09:12

## 2023-12-11 RX ADMIN — METHOCARBAMOL 500 MG: 500 TABLET ORAL at 08:12

## 2023-12-11 RX ADMIN — METOPROLOL SUCCINATE 12.5 MG: 25 TABLET, EXTENDED RELEASE ORAL at 08:12

## 2023-12-11 NOTE — PT/OT/SLP PROGRESS
Physical Therapy Treatment    Patient Name:  Pepper Gardner   MRN:  [id number]    Recommendations:     Discharge therapy intensity: Moderate Intensity Therapy   Discharge Equipment Recommendations: bedside commode, hip kit  Barriers to discharge: Decreased caregiver support and Impaired mobility    Assessment:     Pepper Gardner is a 83 y.o. female admitted with a medical diagnosis of Closed displaced fracture of pelvis.  She presents with the following impairments/functional limitations: weakness, gait instability, impaired balance, impaired endurance, decreased safety awareness, impaired functional mobility.    Rehab Prognosis: Fair; patient would benefit from acute skilled PT services to address these deficits and reach maximum level of function.    Recent Surgery: * No surgery found *      Plan:     During this hospitalization, patient to be seen 6 x/week to address the identified rehab impairments via gait training, therapeutic activities, therapeutic exercises and progress toward the following goals:    Plan of Care Expires:  01/08/24    Subjective     Chief Complaint: pain    Objective:     Communicated with nurse prior to session.  Patient found up in chair with   upon PT entry to room.     General Precautions: Standard, fall  Orthopedic Precautions: LLE weight bearing as tolerated, RLE weight bearing as tolerated  Braces: N/A  Respiratory Status: Nasal cannula, flow 2 L/min  Blood Pressure:   Skin Integrity: Visible skin intact      Functional Mobility:  Pt performed LE PRE's to increase strenth, ROM, and endurance to improve overall independence. Static sit balance in chair and rolling in bed to improve functional independence. Attempted to stand with RW but unsuccessful. Anna transfer BTB.     Education Provided:  Role and goals of PT, transfer training, bed mobility, gait training, balance training, safety awareness, assistive device, strengthening exercises, and importance of participating in PT to  return to PLOF.        Patient left supine with  family to assist with feeding .  GOALS:   Multidisciplinary Problems       Physical Therapy Goals          Problem: Physical Therapy    Goal Priority Disciplines Outcome Goal Variances Interventions   Physical Therapy Goal     PT, PT/OT Ongoing, Progressing     Description: Goals to be met by: 1/3/24     Patient will increase functional independence with mobility by performing:    Sit to supine with Modified [place]  Sit to stand transfer with Modified [place]  Tolerate gait assessment.                         Time Tracking:     Billable Minutes: Therapeutic Activity 10 and Therapeutic Exercise 15    Treatment Type: Treatment  PT/PTA: PTA     Number of PTA visits since last PT visit: 2     12/11/2023

## 2023-12-11 NOTE — PROGRESS NOTES
[facility]  Hospital Medicine Progress Note        Chief Complaint: Inpatient Follow-up for fracture    HPI: 83 y.o. female with a past medical history of essential hypertension, hyperlipidemia, CAD, atrial flutter, valvular heart disease, and dementia presented to [facility] on 12/1/2023 for altered mental status.  Family reported altered mental status began 2 days ago with a fall from her chair.  Patient was seen at [facility] and diagnosed with a pelvic fracture and UTI.  Patient was discharged home with antibiotics and Norco.  Daughter reported worsening confusion and disorientation, prompting them to [facility] ED. initial vital signs upon arrival to ED were /58, pulse 119, respirations 20, temperature 36.7° C, and SpO2 96% on room air.  Labs revealed WBC 10.44, RBC 2.94, hemoglobin 9.7, hematocrit 29.1, MCV 99, CO2 22, BUN 24.8, creatinine 1.45, glucose 135, and lactic acid 1.2.  UA revealed trace occult blood, 25 leukocytes, 11-20 red blood cells, and 6-10 white blood cells.  Blood cultures were obtained.  CT head without contrast revealed no acute intracranial traumatic abnormality.  CT cervical spine revealed no acute cervical spine fracture, dislocation, or subluxation seen with degenerative changes.  CT chest, abdomen, and pelvis with IV contrast revealed comminuted fractures of the left pubic rami with associated pelvic sidewall and prevesical extraperitoneal hematoma, severe likely chronic age indeterminate compression deformity of L1, severe calcific burden of the coronary arteries with widespread mild-to-moderate aortoiliac and branch vessel atherosclerotic changes, and chronic bilateral lung parenchymal changes with focal ground-glass density at the posterior right upper lobe that is nonspecific.  Chest x-ray revealed mild congestive process.  Patient was given LR and IV Rocephin 2 g in ED.  Orthopedics was consulted.  Patient was admitted to trauma services.  Orthopedics  recommended nonoperative treatment with weight-bearing as tolerated.  PT and OT were consulted.  Hospital Medicine was consulted for transition of care and further medical management on 12/3.  Hospital stay was complicated with delirium resulting in confusion for which Seroquel was added.  Neurosurgery was consulted for vertebral compression deformity and they recommended PT, conservative management.     Interval Hx:   Delirium improved, awaiting placement.  No acute overnight events reported by the staff.    Patient was seen and examined at bedside , comfortably sitting on the chair,denied any complaints to me, continues to be quite disoriented intermittently,  family at bedside.No questions/concerns.    Chart was reviewed, afebrile, hemodynamically stable, hemostasis labs were reviewed.      Objective/physical exam:  General: In no acute distress, afebrile  Respiratory: Clear to auscultation bilaterally  Cardiovascular: S1, S2, no appreciable murmur  Abdomen: Soft, nontender, BS +  MSK: Warm, no lower extremity edema, no clubbing or cyanosis  Neurologic: Alert and orientedx1-2, moving all extremities        VITAL SIGNS: 24 HRS MIN & MAX LAST   Temp  Min: 97.5 °F (36.4 °C)  Max: 98.1 °F (36.7 °C) 97.9 °F (36.6 °C)   BP  Min: 90/41  Max: 120/57 119/72   Pulse  Min: 65  Max: 89  87   Resp  Min: 18  Max: 18 18   SpO2  Min: 90 %  Max: 100 % 95 %     I have reviewed the following labs:  Recent Labs   Lab 12/08/23  0434 12/09/23  0600 12/10/23  0625   WBC 7.49 7.71 8.65   RBC 2.83* 2.89* 3.14*   HGB 9.2* 9.4* 10.0*   HCT 28.1* 28.7* 31.5*   MCV 99.3* 99.3* 100.3*   MCH 32.5* 32.5* 31.8*   MCHC 32.7* 32.8* 31.7*   RDW 23.1* 23.4* 23.9*    266 271   MPV 11.5* 11.6* 11.3*     Recent Labs   Lab 12/06/23  0443 12/08/23  0434 12/09/23  1514 12/10/23  0134    134* 137  --    K 3.5 3.8 3.9  --    CO2 25 25 27  --    BUN 10.2 8.5* 10.8  --    CREATININE 0.64 0.66 0.65  --    CALCIUM 8.4 8.3* 8.6  --    PH  --   --    --  7.480*   MG 2.20 2.10  --   --    ALBUMIN 2.4* 2.5*  --   --    ALKPHOS 112 140  --   --    ALT 32 32  --   --    AST 51* 44*  --   --    BILITOT 1.0 1.4  --   --      Microbiology Results (last 7 days)       Procedure Component Value Units Date/Time    Blood culture #1 **CANNOT BE ORDERED STAT** [[id number]]  (Normal) Collected: 12/01/23 2217    Order Status: Completed Specimen: Blood Updated: 12/06/23 2300     CULTURE, BLOOD ([facility]) No Growth at 5 days    Blood culture #2 **CANNOT BE ORDERED STAT** [[id number]]  (Normal) Collected: 12/01/23 2217    Order Status: Completed Specimen: Blood Updated: 12/06/23 2300     CULTURE, BLOOD ([facility]) No Growth at 5 days             See below for Radiology    Scheduled Med:   acetaminophen  650 mg Oral Q6H    apixaban  5 mg Oral BID    digoxin  125 mcg Oral Daily    folic acid  1 mg Oral Daily    LIDOcaine  1 patch Transdermal Q24H    methocarbamoL  500 mg Oral Daily    metoprolol succinate  12.5 mg Oral BID    pantoprazole  40 mg Oral Daily    umeclidinium  1 capsule Inhalation Daily      Continuous Infusions:       PRN Meds:  acetaminophen, budesonide, diphenhydrAMINE, fluticasone furoate-vilanteroL **AND** umeclidinium, levalbuterol, LIDOcaine (PF) 20 mg/mL (2%), melatonin, ondansetron, oxyCODONE, oxyCODONE, QUEtiapine     Assessment/Plan:  Hospital-Acquired Delirium on Dementia  Macrocytic anemia, folate deficiency  Focal ground-glass opacities in the right upper lobe that is nonspecific possibly contusion versus atypical infectious process  Comminuted fracture of the left pubic rami with associated pelvic sidewall and prevescical extraperitoneal hematoma   Age indeterminate L1 compression deformity  Moderate malnutrition    HX:  AFib on Eliquis, HTN, HLD, VHD, dementia, O2 dependent COPD, osteopenia, diverticulosis     Plan:  -Seroquel as needed for Delirium.Improving.  -Hemoglobin stable. Added folate.Resumed Eliquis.    -Received Rocephin per pharmacy. No  fevers/leukocytosis.  -Neurosurgery evaluation noted, recommended continuation of therapy, pain control  -Home Meds were reviewed.Continue Metoprolol, Digoxin at current doses.  Monitor for low blood pressures.  Continue telemetry  -Therapy as tolerated.  Needs nursing home placement  -Cont other supportive care, Fall precautions, Decub precautions    VTE prophylaxis: eliquis      Anticipated discharge and Disposition:     Placement, wean o2    All diagnosis and differential diagnosis have been reviewed; assessment and plan has been documented; I have personally reviewed the labs and test results that are presently available; I have reviewed the patients medication list; I have reviewed the consulting providers response and recommendations. I have reviewed or attempted to review medical records based upon their availability    All of the patient's questions have been  addressed and answered. Patient's is agreeable to the above stated plan. I will continue to monitor closely and make adjustments to medical management as needed.  _____________________________________________________________________    Nutrition Status:    Radiology:  I have personally reviewed the following imaging and agree with the radiologist.     Echo    Left Ventricle: The left ventricle is normal in size. Normal wall   thickness. Normal wall motion. There is normal systolic function with a   visually estimated ejection fraction of 55 - 60%. There is diastolic   dysfunction.    Right Ventricle: Mild right ventricular enlargement. Systolic function   is reduced.TAPSE is 1.52 cm.    Right Atrium: Right atrium is mildly dilated.    Aortic Valve: Moderately calcified cusps. Moderately restricted motion.   There is moderate to severe stenosis. Aortic valve area by VTI is 0.89   cm². Aortic valve peak velocity is 3.9 m/s. Mean gradient is 30 mmHg. The   dimensionless index is 0.31. There is mild aortic regurgitation.    Mitral Valve: There is mild  mitral annular calcification present. There   is mild regurgitation.    Tricuspid Valve: The tricuspid valve is structurally normal. There is   moderate regurgitation.    Pulmonary Artery: The estimated pulmonary artery systolic pressure is   66 mmHg.    IVC/SVC: Intermediate venous pressure at 8 mmHg.      Bridget Piemntel MD   12/11/2023

## 2023-12-11 NOTE — PLAN OF CARE
Spoke with [facility], who states they are reviewing referral and will let me know about acceptance. Sent updated clinicals via [employer].

## 2023-12-11 NOTE — PT/OT/SLP PROGRESS
Occupational Therapy   Treatment    Name: Pepper Gardner  MRN: [id number]  Admitting Diagnosis:  Closed displaced fracture of pelvis       Recommendations:     Recommended therapy intensity at discharge: Moderate Intensity Therapy   Discharge Equipment Recommendations:  bedside commode, hip kit  Barriers to discharge:       Assessment:     Pepper Gardner is a 83 y.o. female with a medical diagnosis of Closed displaced fracture of pelvis.  She presents with increased weakness, however alert and cooperative. Performance deficits affecting function are weakness, impaired endurance, impaired self care skills, impaired functional mobility, impaired balance.     Rehab Prognosis:  Fair; patient would benefit from acute skilled OT services to address these deficits and reach maximum level of function.       Plan:     Patient to be seen 5 x/week to address the above listed problems via self-care/home management, therapeutic activities, therapeutic exercises  Plan of Care Expires: 01/03/24  Plan of Care Reviewed with: patient    Subjective     Pain/Comfort:       Objective:     Communicated with: RN prior to session.  Patient found HOB elevated with   upon OT entry to room.    General Precautions: Standard, fall    Orthopedic Precautions:LLE weight bearing as tolerated, RLE weight bearing as tolerated  Braces: N/A  Respiratory Status: Room air       Occupational Performance:   (Bed Mobility- Mod A)  (Sitting balance-CGA)  Pt. Performing grooming task (washing face) using R UE for excursion to face.   (Sit to stand- Mod  A) using RW unable to step.  Pt. Requiring Max A during squat pivot t/f from EOB to BS chair with B LE blocked.   Pt. Left in chair with miguel sarkis and prudencio with all needs within reach.      Therapeutic Positioning    OT interventions performed during the course of today's session in an effort to prevent and/or reduce acquired pressure injuries:   Therapeutic positioning was provided at the conclusion of session  to offload all bony prominences for the prevention and/or reduction of pressure injuries      [facility] 6 Click ADL:      Patient Education:  Patient provided with verbal education education regarding pressure ulcer prevention.  Additional teaching is warranted.      Patient left up in chair with all lines intact and call button in reach    GOALS:   Multidisciplinary Problems       Occupational Therapy Goals          Problem: Occupational Therapy    Goal Priority Disciplines Outcome Interventions   Occupational Therapy Goal     OT, PT/OT Ongoing, Progressing    Description: Goals to be met by: 1/3/23     Patient will increase functional independence with ADLs by performing:    UE Dressing with [place].  LE Dressing with Modified [place].  Grooming while seated at sink with [place].  Toileting from bedside commode with Minimal Assistance for hygiene and clothing management.   Stand pivot transfers with Minimal Assistance.  Squat pivot transfers with Supervision.  Toilet transfer to bedside commode with Supervision squat pivot.                         Time Tracking:     OT Date of Treatment: 12/11/23  OT Start Time: 0920  OT Stop Time: 0950  OT Total Time (min): 30 min    Billable Minutes:Therapeutic Activity 2    OT/CHRIS: CHRIS     Number of CHRIS visits since last OT visit: 4    12/11/2023

## 2023-12-12 VITALS
HEIGHT: 65 IN | OXYGEN SATURATION: 98 % | TEMPERATURE: 98 F | DIASTOLIC BLOOD PRESSURE: 74 MMHG | HEART RATE: 67 BPM | RESPIRATION RATE: 19 BRPM | WEIGHT: 163.13 LBS | BODY MASS INDEX: 27.18 KG/M2 | SYSTOLIC BLOOD PRESSURE: 117 MMHG

## 2023-12-12 PROBLEM — N17.9 AKI (ACUTE KIDNEY INJURY): Status: RESOLVED | Noted: 2023-12-03 | Resolved: 2023-12-12

## 2023-12-12 PROBLEM — N39.0 UTI (URINARY TRACT INFECTION): Status: RESOLVED | Noted: 2023-12-03 | Resolved: 2023-12-12

## 2023-12-12 PROCEDURE — 25000003 PHARM REV CODE 250: Performed by: INTERNAL MEDICINE

## 2023-12-12 PROCEDURE — 25000003 PHARM REV CODE 250: Performed by: NURSE PRACTITIONER

## 2023-12-12 PROCEDURE — 97530 THERAPEUTIC ACTIVITIES: CPT

## 2023-12-12 PROCEDURE — 27000221 HC OXYGEN, UP TO 24 HOURS

## 2023-12-12 PROCEDURE — 25000003 PHARM REV CODE 250: Performed by: STUDENT IN AN ORGANIZED HEALTH CARE EDUCATION/TRAINING PROGRAM

## 2023-12-12 PROCEDURE — 25000242 PHARM REV CODE 250 ALT 637 W/ HCPCS: Performed by: NURSE PRACTITIONER

## 2023-12-12 PROCEDURE — 94761 N-INVAS EAR/PLS OXIMETRY MLT: CPT

## 2023-12-12 RX ORDER — FOLIC ACID 1 MG/1
1 TABLET ORAL DAILY
Qty: 30 TABLET | Refills: 0 | Status: SHIPPED | OUTPATIENT
Start: 2023-12-12 | End: 2023-12-12 | Stop reason: SDUPTHER

## 2023-12-12 RX ORDER — OXYCODONE HYDROCHLORIDE 5 MG/1
5 TABLET ORAL EVERY 6 HOURS PRN
Qty: 12 TABLET | Refills: 0 | Status: SHIPPED | OUTPATIENT
Start: 2023-12-12

## 2023-12-12 RX ORDER — METHOCARBAMOL 500 MG/1
500 TABLET, FILM COATED ORAL DAILY
Qty: 10 TABLET | Refills: 0 | Status: SHIPPED | OUTPATIENT
Start: 2023-12-12 | End: 2023-12-12 | Stop reason: SDUPTHER

## 2023-12-12 RX ORDER — OXYCODONE HYDROCHLORIDE 5 MG/1
5 TABLET ORAL EVERY 6 HOURS PRN
Qty: 12 TABLET | Refills: 0 | Status: SHIPPED | OUTPATIENT
Start: 2023-12-12 | End: 2023-12-12 | Stop reason: SDUPTHER

## 2023-12-12 RX ORDER — LIDOCAINE 50 MG/G
1 PATCH TOPICAL DAILY
Qty: 10 PATCH | Refills: 0 | Status: SHIPPED | OUTPATIENT
Start: 2023-12-12 | End: 2023-12-12 | Stop reason: SDUPTHER

## 2023-12-12 RX ORDER — METHOCARBAMOL 500 MG/1
500 TABLET, FILM COATED ORAL DAILY
Qty: 10 TABLET | Refills: 0 | Status: SHIPPED | OUTPATIENT
Start: 2023-12-12 | End: 2023-12-22

## 2023-12-12 RX ORDER — LIDOCAINE 50 MG/G
1 PATCH TOPICAL DAILY
Qty: 10 PATCH | Refills: 0 | Status: SHIPPED | OUTPATIENT
Start: 2023-12-12

## 2023-12-12 RX ORDER — QUETIAPINE FUMARATE 25 MG/1
25 TABLET, FILM COATED ORAL NIGHTLY PRN
Qty: 10 TABLET | Refills: 0 | Status: SHIPPED | OUTPATIENT
Start: 2023-12-12 | End: 2023-12-12 | Stop reason: SDUPTHER

## 2023-12-12 RX ORDER — FOLIC ACID 1 MG/1
1 TABLET ORAL DAILY
Qty: 30 TABLET | Refills: 0 | Status: SHIPPED | OUTPATIENT
Start: 2023-12-12

## 2023-12-12 RX ORDER — QUETIAPINE FUMARATE 25 MG/1
25 TABLET, FILM COATED ORAL NIGHTLY PRN
Qty: 10 TABLET | Refills: 0 | Status: SHIPPED | OUTPATIENT
Start: 2023-12-12

## 2023-12-12 RX ADMIN — ACETAMINOPHEN 650 MG: 325 TABLET, FILM COATED ORAL at 01:12

## 2023-12-12 RX ADMIN — METOPROLOL SUCCINATE 12.5 MG: 25 TABLET, EXTENDED RELEASE ORAL at 09:12

## 2023-12-12 RX ADMIN — METHOCARBAMOL 500 MG: 500 TABLET ORAL at 09:12

## 2023-12-12 RX ADMIN — UMECLIDINIUM 62.5 MCG: 62.5 AEROSOL, POWDER ORAL at 09:12

## 2023-12-12 RX ADMIN — ACETAMINOPHEN 650 MG: 325 TABLET, FILM COATED ORAL at 04:12

## 2023-12-12 RX ADMIN — FOLIC ACID 1 MG: 1 TABLET ORAL at 09:12

## 2023-12-12 RX ADMIN — PANTOPRAZOLE SODIUM 40 MG: 40 TABLET, DELAYED RELEASE ORAL at 09:12

## 2023-12-12 RX ADMIN — LIDOCAINE 5% 1 PATCH: 700 PATCH TOPICAL at 04:12

## 2023-12-12 RX ADMIN — APIXABAN 5 MG: 5 TABLET, FILM COATED ORAL at 09:12

## 2023-12-12 RX ADMIN — DIGOXIN 125 MCG: 125 TABLET ORAL at 09:12

## 2023-12-12 NOTE — PT/OT/SLP PROGRESS
Physical Therapy Treatment    Patient Name:  Pepper Gardner   MRN:  [id number]    Recommendations:     Discharge therapy intensity: Moderate Intensity Therapy   Discharge Equipment Recommendations: bedside commode, hip kit  Barriers to discharge: None    Assessment:     Pepper Gardner is a 83 y.o. female admitted with a medical diagnosis of Closed displaced fracture of pelvis.  She presents with the following impairments/functional limitations: weakness, gait instability, impaired balance, impaired endurance, decreased safety awareness, impaired functional mobility. Pt continues to demonstrate significant LE weakness needing Max A for all bed mobility and transfers. Unable to ambulate at this time due to weakness. Pt discharging today to SNF for rehab.     Rehab Prognosis: Fair; patient would benefit from acute skilled PT services to address these deficits and reach maximum level of function.    Recent Surgery: * No surgery found *      Plan:     During this hospitalization, patient to be seen 6 x/week to address the identified rehab impairments via gait training, therapeutic activities, therapeutic exercises and progress toward the following goals:    Plan of Care Expires:  01/08/24    Subjective     Chief Complaint: pelvic pain w/ movement  Patient/Family Comments/goals: none  Pain/Comfort:  Pain Rating 1: 0/10      Objective:     Communicated with nurse prior to session.  Patient found HOB elevated with   upon PT entry to room.     General Precautions: Standard, fall  Orthopedic Precautions: LLE weight bearing as tolerated, RLE weight bearing as tolerated  Braces: N/A  Respiratory Status: Nasal cannula, flow 2 L/min  Blood Pressure:   Skin Integrity: Visible skin intact      Functional Mobility:  Bed Mobility:     Supine to Sit: maximal assistance x2  Sit to Supine: maximal assistance x2  Transfers:     Sit to Stand:  maximal assistance with rolling walker x2  Bed <> BSC: maximal assistance/ total assist x2 with   rolling walker  using  Stand Pivot. Pt''s legs began to  buckle midway, thus requiring total A to complete transfer.    Therapeutic Activities/Exercises:  Static standing at RW Max A ~2min for kameron care.    Education:  Education Provided:  Role and goals of PT, transfer training, bed mobility, gait training, balance training, safety awareness, assistive device, strengthening exercises, and importance of participating in PT to return to PLOF.    Understanding was verbalized.     Patient left HOB elevated with all lines intact, call button in reach, and visitor present..    GOALS:   Multidisciplinary Problems       Physical Therapy Goals          Problem: Physical Therapy    Goal Priority Disciplines Outcome Goal Variances Interventions   Physical Therapy Goal     PT, PT/OT Ongoing, Progressing     Description: Goals to be met by: 1/3/24     Patient will increase functional independence with mobility by performing:    Sit to supine with Modified [place]  Sit to stand transfer with Modified [place]  Tolerate gait assessment.                         Time Tracking:     PT Received On: 12/12/23  PT Start Time: 0837     PT Stop Time: 0902  PT Total Time (min): 25 min     Billable Minutes: Therapeutic Activity 25    Treatment Type: Treatment  PT/PTA: PT     Number of PTA visits since last PT visit: 3     12/12/2023

## 2023-12-12 NOTE — DISCHARGE SUMMARY
[facility] Hospital Medicine Discharge Summary    Admit Date: 12/1/2023  Discharge Date and Time: 12/12/20239:01 AM  Admitting Physician: COURTNEY Team  Discharging Physician: Tani Naranjo MD.  Primary Care Physician: Dianna, Primary Doctor  Consults: Neurosurgery and Orthopedic Surgery    Discharge Diagnoses:  Hospital-Acquired Delirium on Dementia- resolved   Macrocytic anemia, folate deficiency  Focal ground-glass opacities in the right upper lobe that is nonspecific possibly contusion versus atypical infectious process  Comminuted fracture of the left pubic rami with associated pelvic sidewall and prevescical extraperitoneal hematoma   Age indeterminate L1 compression deformity  Moderate malnutrition  HX:  AFib on Eliquis, HTN, HLD, VHD, dementia, O2 dependent COPD, osteopenia, diverticulosis    Hospital Course:   83 y.o. female with a past medical history of essential hypertension, hyperlipidemia, CAD, atrial flutter, valvular heart disease, and dementia presented to [facility] on 12/1/2023 for altered mental status.  Family reported altered mental status began 2 days ago with a fall from her chair.  Patient was seen at [facility] and diagnosed with a pelvic fracture and UTI.  Patient was discharged home with antibiotics and Norco.  Daughter reported worsening confusion and disorientation, prompting them to [facility] ED. initial vital signs upon arrival to ED were /58, pulse 119, respirations 20, temperature 36.7° C, and SpO2 96% on room air.  Labs revealed WBC 10.44, RBC 2.94, hemoglobin 9.7, hematocrit 29.1, MCV 99, CO2 22, BUN 24.8, creatinine 1.45, glucose 135, and lactic acid 1.2.  UA revealed trace occult blood, 25 leukocytes, 11-20 red blood cells, and 6-10 white blood cells.  Blood cultures were obtained.  CT head without contrast revealed no acute intracranial traumatic abnormality.  CT cervical spine revealed no acute cervical spine fracture, dislocation, or subluxation seen with  degenerative changes.  CT chest, abdomen, and pelvis with IV contrast revealed comminuted fractures of the left pubic rami with associated pelvic sidewall and prevesical extraperitoneal hematoma, severe likely chronic age indeterminate compression deformity of L1, severe calcific burden of the coronary arteries with widespread mild-to-moderate aortoiliac and branch vessel atherosclerotic changes, and chronic bilateral lung parenchymal changes with focal ground-glass density at the posterior right upper lobe that is nonspecific.  Chest x-ray revealed mild congestive process.  Patient was given LR and IV Rocephin 2 g in ED.  Orthopedics was consulted.  Patient was admitted to trauma services.  Orthopedics recommended nonoperative treatment with weight-bearing as tolerated.  PT and OT were consulted.  Hospital Medicine was consulted for transition of care and further medical management on 12/3.  Hospital stay was complicated with delirium resulting in confusion for which Seroquel was added. Neurosurgery was consulted for vertebral compression deformity and they recommended PT, conservative management.    Seroquel as needed for Delirium.Improving.  Hemoglobin stable. Added folate. Resumed Eliquis.    Completed rocephin   Neurosurgery evaluation noted, recommended continuation of therapy, pain control  Home Meds were reviewed. Continue Metoprolol, Digoxin at current doses.  Therapy as tolerated.    Cont other supportive care, Fall precautions, Decub precautions  [facility]     Despite 3 repeated attempts attempts, prescription is continuously going down to [facility] retail pharmacy the patient's primary pharmacy is set up as [facility].  [facility] retail pharmacy and unable to forward the prescription to [facility] and [facility] as unable to pull [facility] pharmacy so the prescription from we will print the  prescriptions    Pt was seen and examined on the day of discharge  Vitals:  VITAL SIGNS: 24 HRS MIN & MAX LAST   Temp  Min: 97.5 °F (36.4 °C)  Max: 99.7 °F (37.6 °C) 97.5 °F (36.4 °C)   BP  Min: 105/58  Max: 155/70 117/74   Pulse  Min: 66  Max: 90  67   Resp  Min: 16  Max: 19 19   SpO2  Min: 91 %  Max: 98 % 98 %       Physical Exam:  General: In no acute distress, afebrile  Respiratory: Clear to auscultation bilaterally  Cardiovascular: S1, S2, 3/6 systolic murmur heard  Abdomen: Soft, nontender, BS +  MSK: Warm, no lower extremity edema, no clubbing or cyanosis  Neurologic: Alert and orientedx1-2, moving all extremities     Recent Labs   Lab 12/08/23  0434 12/09/23  0600 12/10/23  0625   WBC 7.49 7.71 8.65   RBC 2.83* 2.89* 3.14*   HGB 9.2* 9.4* 10.0*   HCT 28.1* 28.7* 31.5*   MCV 99.3* 99.3* 100.3*   MCH 32.5* 32.5* 31.8*   MCHC 32.7* 32.8* 31.7*   RDW 23.1* 23.4* 23.9*    266 271   MPV 11.5* 11.6* 11.3*       Recent Labs   Lab 12/06/23  0443 12/08/23  0434 12/09/23  1514 12/10/23  0134    134* 137  --    K 3.5 3.8 3.9  --    CO2 25 25 27  --    BUN 10.2 8.5* 10.8  --    CREATININE 0.64 0.66 0.65  --    CALCIUM 8.4 8.3* 8.6  --    PH  --   --   --  7.480*   MG 2.20 2.10  --   --    ALBUMIN 2.4* 2.5*  --   --    ALKPHOS 112 140  --   --    ALT 32 32  --   --    AST 51* 44*  --   --    BILITOT 1.0 1.4  --   --         Microbiology Results (last 7 days)       Procedure Component Value Units Date/Time    Blood culture #1 **CANNOT BE ORDERED STAT** [[id number]]  (Normal) Collected: 12/01/23 2217    Order Status: Completed Specimen: Blood Updated: 12/06/23 2300     CULTURE, BLOOD ([facility]) No Growth at 5 days    Blood culture #2 **CANNOT BE ORDERED STAT** [[id number]]  (Normal) Collected: 12/01/23 2217    Order Status: Completed Specimen: Blood Updated: 12/06/23 2300     CULTURE, BLOOD ([facility]) No Growth at 5 days             Echo    Left Ventricle: The left ventricle is normal in size. Normal wall   thickness.  Normal wall motion. There is normal systolic function with a   visually estimated ejection fraction of 55 - 60%. There is diastolic   dysfunction.    Right Ventricle: Mild right ventricular enlargement. Systolic function   is reduced.TAPSE is 1.52 cm.    Right Atrium: Right atrium is mildly dilated.    Aortic Valve: Moderately calcified cusps. Moderately restricted motion.   There is moderate to severe stenosis. Aortic valve area by VTI is 0.89   cm². Aortic valve peak velocity is 3.9 m/s. Mean gradient is 30 mmHg. The   dimensionless index is 0.31. There is mild aortic regurgitation.    Mitral Valve: There is mild mitral annular calcification present. There   is mild regurgitation.    Tricuspid Valve: The tricuspid valve is structurally normal. There is   moderate regurgitation.    Pulmonary Artery: The estimated pulmonary artery systolic pressure is   66 mmHg.    IVC/SVC: Intermediate venous pressure at 8 mmHg.         Medication List        START taking these medications      folic acid 1 MG tablet  Commonly known as: FOLVITE  Take 1 tablet (1 mg total) by mouth once daily.     LIDOcaine 5 %  Commonly known as: LIDODERM  Place 1 patch onto the skin once daily. Remove & Discard patch within 12 hours or as directed by MD     methocarbamoL 500 MG Tab  Commonly known as: ROBAXIN  Take 1 tablet (500 mg total) by mouth once daily. for 10 days     oxyCODONE 5 MG immediate release tablet  Commonly known as: ROXICODONE  Take 1 tablet (5 mg total) by mouth every 6 (six) hours as needed for Pain.     QUEtiapine 25 MG Tab  Commonly known as: SEROQUEL  Take 1 tablet (25 mg total) by mouth nightly as needed (Delirium).            CONTINUE taking these medications      acetaminophen 500 MG tablet  Commonly known as: TYLENOL     budesonide 1 mg/2 mL Nbsp     digoxin 125 mcg tablet  Commonly known as: LANOXIN     ELIQUIS 5 mg Tab  Generic drug: apixaban     levalbuterol 1.25 mg/3 mL nebulizer solution  Commonly known as:  XOPENEX     metoprolol succinate 25 MG 24 hr tablet  Commonly known as: TOPROL-XL     pantoprazole 40 MG tablet  Commonly known as: PROTONIX     simvastatin 20 MG tablet  Commonly known as: ZOCOR     TRELEGY ELLIPTA 100-62.5-25 mcg Dsdv  Generic drug: fluticasone-umeclidin-vilanter            STOP taking these medications      potassium chloride SA 15 MEQ tablet  Commonly known as: KLOR-CON M15     torsemide 20 MG Tab  Commonly known as: DEMADEX               Where to Get Your Medications        You can get these medications from any pharmacy    Bring a paper prescription for each of these medications  folic acid 1 MG tablet  LIDOcaine 5 %  methocarbamoL 500 MG Tab  oxyCODONE 5 MG immediate release tablet  QUEtiapine 25 MG Tab          Explained in detail to the patient about the discharge plan, medications, and follow-up visits. Pt understands and agrees with the treatment plan  Discharge Disposition:   [facility]   Discharged Condition: stable  Diet-   Dietary Orders (From admission, onward)       Start     Ordered    12/07/23 1216  Dietary nutrition supplements Boost Plus Vanilla; BID  Continuous        Question Answer Comment   Select PO Supplement: Boost Plus Vanilla    Frequency: BID        12/07/23 1215    12/02/23 1112  Diet heart healthy  Diet effective now         12/02/23 1130                   Medications Per DC med rec  Activities as tolerated   Follow-up Information       Rayo Cabral MD. Go on 1/10/2024.    Specialties: Hand Surgery, Orthopedic Surgery  Why: post hospital discharge  time of appointment: @ 8:00 am  Contact information:  [address]  [facility] [address]  [phone]               F/U 3iwth your PCP in 2 wks post discharge Follow up.                           For further questions contact hospitalist office    Discharge time 34 minutes    For worsening symptoms, chest pain, shortness of breath, increased abdominal pain, high grade fever, stroke or stroke  like symptoms, immediately go to the nearest Emergency Room or call 911 as soon as possible.      Tani Naranjo MD  Department of Hospital Medicine   [facility]   12/12/2023 9:01 AM

## 2023-12-12 NOTE — PLAN OF CARE
Sent all DC information to [facility] via [employer]. DC packet given to nurse for report. Pt will transport via [facility] van, set to  pt at 3:30pm today. Had to send hard scripts due to [employer] rerouting E-scripts to our retail pharmacy.

## 2023-12-26 ENCOUNTER — LAB REQUISITION (OUTPATIENT)
Dept: LAB | Facility: HOSPITAL | Age: 83
End: 2023-12-26
Payer: MEDICARE

## 2023-12-26 DIAGNOSIS — R50.9 FEVER, UNSPECIFIED: ICD-10-CM

## 2023-12-26 LAB
APPEARANCE UR: CLEAR
BACTERIA #/AREA URNS AUTO: NORMAL /HPF
BASOPHILS # BLD AUTO: 0.03 X10(3)/MCL
BASOPHILS NFR BLD AUTO: 0.2 %
BILIRUB UR QL STRIP.AUTO: NEGATIVE
COLOR UR AUTO: ABNORMAL
EOSINOPHIL # BLD AUTO: 0 X10(3)/MCL (ref 0–0.9)
EOSINOPHIL NFR BLD AUTO: 0 %
ERYTHROCYTE [DISTWIDTH] IN BLOOD BY AUTOMATED COUNT: 24.9 % (ref 11.5–17)
GLUCOSE UR QL STRIP.AUTO: NEGATIVE
HCT VFR BLD AUTO: 27.8 % (ref 37–47)
HGB BLD-MCNC: 8.2 G/DL (ref 12–16)
IMM GRANULOCYTES # BLD AUTO: 0.21 X10(3)/MCL (ref 0–0.04)
IMM GRANULOCYTES NFR BLD AUTO: 1.4 %
KETONES UR QL STRIP.AUTO: NEGATIVE
LEUKOCYTE ESTERASE UR QL STRIP.AUTO: NEGATIVE
LYMPHOCYTES # BLD AUTO: 0.59 X10(3)/MCL (ref 0.6–4.6)
LYMPHOCYTES NFR BLD AUTO: 3.8 %
MCH RBC QN AUTO: 30.4 PG (ref 27–31)
MCHC RBC AUTO-ENTMCNC: 29.5 G/DL (ref 33–36)
MCV RBC AUTO: 103 FL (ref 80–94)
MONOCYTES # BLD AUTO: 0.73 X10(3)/MCL (ref 0.1–1.3)
MONOCYTES NFR BLD AUTO: 4.7 %
NEUTROPHILS # BLD AUTO: 13.88 X10(3)/MCL (ref 2.1–9.2)
NEUTROPHILS NFR BLD AUTO: 89.9 %
NITRITE UR QL STRIP.AUTO: NEGATIVE
PH UR STRIP.AUTO: 6 [PH]
PLATELET # BLD AUTO: 250 X10(3)/MCL (ref 130–400)
PMV BLD AUTO: 11.5 FL (ref 7.4–10.4)
PROT UR QL STRIP.AUTO: NEGATIVE
RBC # BLD AUTO: 2.7 X10(6)/MCL (ref 4.2–5.4)
RBC #/AREA URNS AUTO: NORMAL /HPF
RBC UR QL AUTO: NEGATIVE
SP GR UR STRIP.AUTO: 1.02 (ref 1–1.03)
SQUAMOUS #/AREA URNS AUTO: NORMAL /HPF
UROBILINOGEN UR STRIP-ACNC: >=8
WBC # SPEC AUTO: 15.44 X10(3)/MCL (ref 4.5–11.5)
WBC #/AREA URNS AUTO: NORMAL /HPF

## 2023-12-26 PROCEDURE — 85025 COMPLETE CBC W/AUTO DIFF WBC: CPT | Performed by: INTERNAL MEDICINE

## 2023-12-26 PROCEDURE — 81003 URINALYSIS AUTO W/O SCOPE: CPT | Performed by: INTERNAL MEDICINE

## 2023-12-26 PROCEDURE — 87086 URINE CULTURE/COLONY COUNT: CPT | Performed by: INTERNAL MEDICINE

## 2023-12-29 LAB — BACTERIA UR CULT: ABNORMAL

## 2024-01-14 ENCOUNTER — LAB REQUISITION (OUTPATIENT)
Dept: LAB | Facility: HOSPITAL | Age: 84
End: 2024-01-14
Attending: INTERNAL MEDICINE
Payer: MEDICARE

## 2024-01-14 DIAGNOSIS — D64.89 OTHER SPECIFIED ANEMIAS: ICD-10-CM

## 2024-01-14 LAB
ERYTHROCYTE [DISTWIDTH] IN BLOOD BY AUTOMATED COUNT: 24.3 % (ref 11.5–17)
HCT VFR BLD AUTO: 31.9 % (ref 37–47)
HGB BLD-MCNC: 9.7 G/DL (ref 12–16)
MCH RBC QN AUTO: 29.7 PG (ref 27–31)
MCHC RBC AUTO-ENTMCNC: 30.4 G/DL (ref 33–36)
MCV RBC AUTO: 97.6 FL (ref 80–94)
PLATELET # BLD AUTO: 195 X10(3)/MCL (ref 130–400)
PMV BLD AUTO: 11.4 FL (ref 7.4–10.4)
RBC # BLD AUTO: 3.27 X10(6)/MCL (ref 4.2–5.4)
WBC # SPEC AUTO: 9.86 X10(3)/MCL (ref 4.5–11.5)

## 2024-01-14 PROCEDURE — 85027 COMPLETE CBC AUTOMATED: CPT | Performed by: INTERNAL MEDICINE

## 2024-01-15 LAB
ADDITIONAL FINDINGS (OHS): ABNORMAL
ANISOCYTOSIS BLD QL SMEAR: ABNORMAL
PLATELET # BLD EST: NORMAL 10*3/UL
RBC MORPH BLD: ABNORMAL

## 2024-01-18 ENCOUNTER — LAB REQUISITION (OUTPATIENT)
Dept: LAB | Facility: HOSPITAL | Age: 84
End: 2024-01-18
Attending: INTERNAL MEDICINE
Payer: MEDICARE

## 2024-01-18 DIAGNOSIS — I10 ESSENTIAL (PRIMARY) HYPERTENSION: ICD-10-CM

## 2024-01-18 LAB
ALBUMIN SERPL-MCNC: 2.2 G/DL (ref 3.4–4.8)
ALBUMIN/GLOB SERPL: 0.6 RATIO (ref 1.1–2)
ALP SERPL-CCNC: 171 UNIT/L (ref 40–150)
ALT SERPL-CCNC: 84 UNIT/L (ref 0–55)
AST SERPL-CCNC: 39 UNIT/L (ref 5–34)
BILIRUB SERPL-MCNC: 1.2 MG/DL
BUN SERPL-MCNC: 37.4 MG/DL (ref 9.8–20.1)
CALCIUM SERPL-MCNC: 9 MG/DL (ref 8.4–10.2)
CHLORIDE SERPL-SCNC: 103 MMOL/L (ref 98–107)
CO2 SERPL-SCNC: 26 MMOL/L (ref 23–31)
CREAT SERPL-MCNC: 0.73 MG/DL (ref 0.55–1.02)
GFR SERPLBLD CREATININE-BSD FMLA CKD-EPI: >60 MLS/MIN/1.73/M2
GLOBULIN SER-MCNC: 3.7 GM/DL (ref 2.4–3.5)
GLUCOSE SERPL-MCNC: 155 MG/DL (ref 82–115)
POTASSIUM SERPL-SCNC: 4.2 MMOL/L (ref 3.5–5.1)
PROT SERPL-MCNC: 5.9 GM/DL (ref 5.8–7.6)
SODIUM SERPL-SCNC: 134 MMOL/L (ref 136–145)

## 2024-01-18 PROCEDURE — 80053 COMPREHEN METABOLIC PANEL: CPT | Performed by: INTERNAL MEDICINE
